# Patient Record
Sex: MALE | Race: BLACK OR AFRICAN AMERICAN | URBAN - NONMETROPOLITAN AREA
[De-identification: names, ages, dates, MRNs, and addresses within clinical notes are randomized per-mention and may not be internally consistent; named-entity substitution may affect disease eponyms.]

---

## 2023-03-17 ENCOUNTER — APPOINTMENT (OUTPATIENT)
Dept: GENERAL RADIOLOGY | Age: 38
End: 2023-03-17

## 2023-03-17 ENCOUNTER — HOSPITAL ENCOUNTER (EMERGENCY)
Age: 38
Discharge: HOME | End: 2023-03-18
Attending: EMERGENCY MEDICINE

## 2023-03-17 VITALS
RESPIRATION RATE: 20 BRPM | OXYGEN SATURATION: 98 % | BODY MASS INDEX: 23.86 KG/M2 | WEIGHT: 180 LBS | SYSTOLIC BLOOD PRESSURE: 123 MMHG | DIASTOLIC BLOOD PRESSURE: 74 MMHG | HEIGHT: 73 IN | HEART RATE: 51 BPM | TEMPERATURE: 98 F

## 2023-03-17 DIAGNOSIS — R07.9 CHEST PAIN, UNSPECIFIED TYPE: Primary | ICD-10-CM

## 2023-03-17 LAB
ALBUMIN SERPL BCG-MCNC: 4.2 G/DL (ref 3.5–5.1)
ALP SERPL-CCNC: 69 U/L (ref 38–126)
ALT SERPL W/O P-5'-P-CCNC: 30 U/L (ref 11–66)
ANION GAP SERPL CALC-SCNC: 8 MEQ/L (ref 8–16)
AST SERPL-CCNC: 22 U/L (ref 5–40)
BASOPHILS ABSOLUTE: 0 THOU/MM3 (ref 0–0.1)
BASOPHILS NFR BLD AUTO: 0.2 %
BILIRUB CONJ SERPL-MCNC: < 0.2 MG/DL (ref 0–0.3)
BILIRUB SERPL-MCNC: 0.7 MG/DL (ref 0.3–1.2)
BUN SERPL-MCNC: 9 MG/DL (ref 7–22)
CALCIUM SERPL-MCNC: 9.5 MG/DL (ref 8.5–10.5)
CHLORIDE SERPL-SCNC: 103 MEQ/L (ref 98–111)
CO2 SERPL-SCNC: 28 MEQ/L (ref 23–33)
CREAT SERPL-MCNC: 1.1 MG/DL (ref 0.4–1.2)
D DIMER PPP IA.FEU-MCNC: 368 NG/ML FEU (ref 0–500)
DEPRECATED RDW RBC AUTO: 47.1 FL (ref 35–45)
EOSINOPHIL NFR BLD AUTO: 1.3 %
EOSINOPHILS ABSOLUTE: 0.1 THOU/MM3 (ref 0–0.4)
ERYTHROCYTE [DISTWIDTH] IN BLOOD BY AUTOMATED COUNT: 15 % (ref 11.5–14.5)
GFR SERPL CREATININE-BSD FRML MDRD: > 60 ML/MIN/1.73M2
GLUCOSE SERPL-MCNC: 95 MG/DL (ref 70–108)
HCT VFR BLD AUTO: 45.2 % (ref 42–52)
HGB BLD-MCNC: 15.3 GM/DL (ref 14–18)
IMM GRANULOCYTES # BLD AUTO: 0.01 THOU/MM3 (ref 0–0.07)
IMM GRANULOCYTES NFR BLD AUTO: 0.2 %
LYMPHOCYTES ABSOLUTE: 3.5 THOU/MM3 (ref 1–4.8)
LYMPHOCYTES NFR BLD AUTO: 57.2 %
MAGNESIUM SERPL-MCNC: 2 MG/DL (ref 1.6–2.4)
MCH RBC QN AUTO: 29.1 PG (ref 26–33)
MCHC RBC AUTO-ENTMCNC: 33.8 GM/DL (ref 32.2–35.5)
MCV RBC AUTO: 85.9 FL (ref 80–94)
MONOCYTES ABSOLUTE: 0.4 THOU/MM3 (ref 0.4–1.3)
MONOCYTES NFR BLD AUTO: 7.2 %
NEUTROPHILS NFR BLD AUTO: 33.9 %
NRBC BLD AUTO-RTO: 0 /100 WBC
OSMOLALITY SERPL CALC.SUM OF ELEC: 276 MOSMOL/KG (ref 275–300)
PLATELET # BLD AUTO: 173 THOU/MM3 (ref 130–400)
PMV BLD AUTO: 11.2 FL (ref 9.4–12.4)
POTASSIUM SERPL-SCNC: 4 MEQ/L (ref 3.5–5.2)
PROT SERPL-MCNC: 7.3 G/DL (ref 6.1–8)
RBC # BLD AUTO: 5.26 MILL/MM3 (ref 4.7–6.1)
SEGMENTED NEUTROPHILS ABSOLUTE COUNT: 2.1 THOU/MM3 (ref 1.8–7.7)
SODIUM SERPL-SCNC: 139 MEQ/L (ref 135–145)
TROPONIN T: < 0.01 NG/ML
WBC # BLD AUTO: 6.1 THOU/MM3 (ref 4.8–10.8)

## 2023-03-17 PROCEDURE — 85025 COMPLETE CBC W/AUTO DIFF WBC: CPT

## 2023-03-17 PROCEDURE — 80053 COMPREHEN METABOLIC PANEL: CPT

## 2023-03-17 PROCEDURE — 83735 ASSAY OF MAGNESIUM: CPT

## 2023-03-17 PROCEDURE — 82248 BILIRUBIN DIRECT: CPT

## 2023-03-17 PROCEDURE — 84484 ASSAY OF TROPONIN QUANT: CPT

## 2023-03-17 PROCEDURE — 99285 EMERGENCY DEPT VISIT HI MDM: CPT

## 2023-03-17 PROCEDURE — 85379 FIBRIN DEGRADATION QUANT: CPT

## 2023-03-17 PROCEDURE — 36415 COLL VENOUS BLD VENIPUNCTURE: CPT

## 2023-03-17 PROCEDURE — 93005 ELECTROCARDIOGRAM TRACING: CPT | Performed by: EMERGENCY MEDICINE

## 2023-03-17 PROCEDURE — 71046 X-RAY EXAM CHEST 2 VIEWS: CPT

## 2023-03-17 ASSESSMENT — PAIN - FUNCTIONAL ASSESSMENT: PAIN_FUNCTIONAL_ASSESSMENT: 0-10

## 2023-03-17 ASSESSMENT — PAIN DESCRIPTION - LOCATION: LOCATION: CHEST

## 2023-03-18 LAB
EKG ATRIAL RATE: 61 BPM
EKG P AXIS: 53 DEGREES
EKG P-R INTERVAL: 166 MS
EKG Q-T INTERVAL: 406 MS
EKG QRS DURATION: 94 MS
EKG QTC CALCULATION (BAZETT): 408 MS
EKG R AXIS: 47 DEGREES
EKG T AXIS: -2 DEGREES
EKG VENTRICULAR RATE: 61 BPM
TROPONIN T: < 0.01 NG/ML

## 2023-03-18 PROCEDURE — 93010 ELECTROCARDIOGRAM REPORT: CPT | Performed by: INTERNAL MEDICINE

## 2023-03-18 ASSESSMENT — HEART SCORE: ECG: 0

## 2023-03-18 NOTE — ED TRIAGE NOTES
Patient presents to the ed with c/o chest pain that has been on going on for about a year. Pt rating 6/10 at this time time. Denies taking any medication. Ekg complete. Tele place.

## 2023-03-18 NOTE — ED PROVIDER NOTES
[address] [address]          Pt Name: Francie Tejeda  MRN: [id number]  [place] 1985  Date of evaluation: 3/17/2023  ED Resident: Beth Mason MD  ED Attending: Dr. Sandor Reyes       Chief Complaint   Patient presents with    Chest Pain     History obtained from the patient. HISTORY OF PRESENT ILLNESS    HPI  Francie Tejeda is a 40 y.o. male who presents to the emergency department for evaluation of chest pain and shortness of breath. Patient states that he has been having chest pain and shortness of breath intermittently for approximately 1 year. He was diagnosed with pneumonia in his [place] country [place], given antibiotics and his condition improved, however, shortly after this he began to experience chest pain and shortness of breath. His chest pain is worse on exertion, which induces anxiety and shortness of breath. The patient feels like his chest gets tight and he cannot get enough air an. He recently traveled from [place] to the [place], approximately a week ago. Patient does not smoke, consume alcohol, no history of blood clots, no cancer history, no cardiac history in the family, no recent illness or sick contacts, afebrile, denies leg pain or swelling, abdominal pain, nausea, vomiting, diarrhea, dysuria. He does have a history of anxiety, takes a medication from his home country that helps him \"calm down\" when he has these episodes. The patient has no other acute complaints at this time. PAST MEDICAL AND SURGICAL HISTORY   History reviewed. No pertinent past medical history. History reviewed. No pertinent surgical history. MEDICATIONS   No current facility-administered medications for this encounter. No current outpatient medications on file.       SOCIAL HISTORY     Social History     Social History Narrative    Not on file          ALLERGIES   No Known Allergies      FAMILY HISTORY History reviewed. No pertinent family history. PHYSICAL EXAM     ED Triage Vitals [03/17/23 2054]   BP Temp Temp Source Heart Rate Resp SpO2 Height Weight   133/73 98 °F (36.7 °C) Oral 60 22 98 % 6' 1\" (1.854 m) 180 lb (81.6 kg)         Additional Vital Signs:  Vitals:    03/17/23 2340   BP: 123/74   Pulse: 51   Resp: 20   Temp:    SpO2: 98%       Physical Exam      DIFFERENTIALS   Initial Assessment: Given the patient's above chief complaint and findings on history and physical examination, I thought it was appropriate to consider the following emergency medical conditions:    Anxiety, ACS, PE, pneumothorax, myocarditis, pericarditis, pneumonia    Although some of these diagnoses are unlikely they were considered in my medical decision making. Chronic Conditions considered: There is no problem list on file for this patient. ED RESULTS   Laboratory results:  Labs Reviewed   CBC WITH AUTO DIFFERENTIAL - Abnormal; Notable for the following components:       Result Value    RDW-CV 15.0 (*)     RDW-SD 47.1 (*)     All other components within normal limits   TROPONIN   MAGNESIUM   BASIC METABOLIC PANEL   HEPATIC FUNCTION PANEL   D-DIMER, QUANTITATIVE   ANION GAP   OSMOLALITY   GLOMERULAR FILTRATION RATE, ESTIMATED   TROPONIN       Radiologic studies results:  XR CHEST (2 VW)   Final Result   Impression:   1. No acute cardiopulmonary disease seen. This document has been electronically signed by: Cecy Colunga MD on    03/17/2023 09:59 PM          ED Medications administered this visit: Medications - No data to display      [address]      Available laboratory and imaging results were independently reviewed and clinically correlated. Decision Rules/Clinical Scores utilized: Heart score 1    Code Status:  Not addressed during this ED visit    Social determinants of health impacting treatment or disposition: New to the [place], needs to establish primary care here.     Medical Commodities impacting treatment or disposition:   Patient denies significant medical history. Consultants: Not Applicable. Final Assessment and Plan:   51-year-old male presents with episodic exertional chest pain, which is accompanied by anxiety and shortness of breath. Negative personal or family history. Recent travel with chest pain and shortness of breath necessitated D-dimer. D-dimer was within normal limits. Chest x-ray unremarkable. EKG negative for ST or T wave changes, troponin not elevated. Labs unremarkable. Patient was given an appointment at the chest pain clinic for Wednesday. He plans to go and establish care with a cardiologist.  He was also given information on the family medicine resident clinic for primary care. Discharged home. MEDICATION CHANGES     DISCHARGE MEDICATIONS:  There are no discharge medications for this patient. FINAL DISPOSITION     Final diagnoses:   Chest pain, unspecified type     Condition: condition: stable  Dispo: Discharge to home    PATIENT REFERRED TO:  [facility] [address],[address] [address] [facility] [address] [place] [address]    Follow-up from ER visit    [address] [facility]  [address] [place] [address]  [phone]  Call   Follow-up from ED for chest pain. Appointment scheduled for Wednesday, 3/22 at 10 AM. Directions to clinic attached. The results of pertinent diagnostic studies and exam findings were discussed. The patients provisional diagnosis and plan of care were discussed with the patient who expressed understanding. Strict verbal and written return precautions, instructions and appropriate follow-up provided to  the patient    Critical Care Time   CRITICAL CARE:  None    PROCEDURES: (None if blank)  Procedures: This transcription was electronically signed.  Parts of this transcriptions may have been dictated by use of voice recognition software and electronically transcribed, and parts may have been transcribed with the assistance of an ED scribe. The transcription may contain errors not detected in proofreading.     Electronically Signed: Emy Wilks MD, 03/18/23, 2:22 AM        Emy Wilks MD  Resident  03/18/23 7339

## 2023-03-18 NOTE — DISCHARGE INSTRUCTIONS
You were seen at [facility] emergency department for chest pain and shortness of breath. Your EKG did not show any signs of heart attack, and your lab work was normal.  Your chest x-ray was also normal.       Please follow-up with a cardiologist on Wednesday at 10 AM.  Information attached. Do not hesitate to return to the emergency department if your chest pain gets worse, or if you experience worsening shortness of breath. Information on the family medicine resident clinic is also attached your paperwork.

## 2023-03-22 ENCOUNTER — OFFICE VISIT (OUTPATIENT)
Dept: CARDIOLOGY CLINIC | Age: 38
End: 2023-03-22

## 2023-03-22 VITALS
WEIGHT: 216 LBS | HEIGHT: 73 IN | SYSTOLIC BLOOD PRESSURE: 130 MMHG | BODY MASS INDEX: 28.63 KG/M2 | HEART RATE: 90 BPM | DIASTOLIC BLOOD PRESSURE: 69 MMHG

## 2023-03-22 DIAGNOSIS — R06.02 SOB (SHORTNESS OF BREATH): ICD-10-CM

## 2023-03-22 DIAGNOSIS — I10 PRIMARY HYPERTENSION: ICD-10-CM

## 2023-03-22 DIAGNOSIS — R07.2 PRECORDIAL PAIN: Primary | ICD-10-CM

## 2023-03-22 PROCEDURE — 3075F SYST BP GE 130 - 139MM HG: CPT | Performed by: NUCLEAR MEDICINE

## 2023-03-22 PROCEDURE — 3078F DIAST BP <80 MM HG: CPT | Performed by: NUCLEAR MEDICINE

## 2023-03-22 PROCEDURE — 99204 OFFICE O/P NEW MOD 45 MIN: CPT | Performed by: NUCLEAR MEDICINE

## 2023-03-22 RX ORDER — PANTOPRAZOLE SODIUM 40 MG/1
40 TABLET, DELAYED RELEASE ORAL
Qty: 90 TABLET | Refills: 1 | Status: SHIPPED | OUTPATIENT
Start: 2023-03-22

## 2023-03-22 ASSESSMENT — ENCOUNTER SYMPTOMS
VOMITING: 0
BACK PAIN: 0
SHORTNESS OF BREATH: 1
CONSTIPATION: 0
DIARRHEA: 0
ABDOMINAL DISTENTION: 0
NAUSEA: 0
COLOR CHANGE: 0
CHEST TIGHTNESS: 1
BLOOD IN STOOL: 0
PHOTOPHOBIA: 0
RECTAL PAIN: 0
ANAL BLEEDING: 0
ABDOMINAL PAIN: 0

## 2023-03-22 NOTE — PROGRESS NOTES
New patient here today. Was in the Ed for chest pain. Is still having chest pain, sob, palpitations, dizziness (when walking)  and blurred vision.  States that he feels weak like he is going to faint
General: No swelling, tenderness, deformity or signs of injury. Cervical back: Normal range of motion. Right lower leg: No edema. Left lower leg: No edema. Skin:     Coloration: Skin is not jaundiced or pale. Findings: No bruising, erythema, lesion or rash. Neurological:      Mental Status: He is alert and oriented to person, place, and time. Cranial Nerves: No cranial nerve deficit. Sensory: No sensory deficit. Motor: No weakness. Coordination: Coordination normal.      Gait: Gait normal.      Deep Tendon Reflexes: Reflexes normal.   Psychiatric:         Mood and Affect: Mood normal.     /69   Pulse 90   Ht 6' 1\" (1.854 m)   Wt 216 lb (98 kg)   BMI 28.50 kg/m²     Assessment:      Diagnosis Orders   1. Precordial pain        2. Primary hypertension        As above  Symptoms as above  Risk for CAD    Plan:  No follow-ups on file. Discussed  Non invasive cardiac testing will be ordered to further evaluate for any ischemic or structural heart disease as a cause of the patient symptoms. We will proceed with a Stress Cardiolite test and echo soon. Continue risk factor modification and medical management  Thank you for allowing me to participate in the care of your patient. Please don't hesitate to contact me regarding any further issues related to the patient care    Orders Placed:  No orders of the defined types were placed in this encounter. Medications Prescribed:  No orders of the defined types were placed in this encounter. Discussed use, benefit, and side effects of prescribed medications. All patient questions answered. Pt voicedunderstanding. Instructed to continue current medications, diet and exercise. Continue risk factor modification and medical management. Patient agreed with treatment plan. Follow up as directed.     Electronically signedby Luke Winston MD on 3/22/2023 at 10:35 AM

## 2023-04-20 ENCOUNTER — APPOINTMENT (OUTPATIENT)
Dept: GENERAL RADIOLOGY | Age: 38
End: 2023-04-20
Payer: COMMERCIAL

## 2023-04-20 ENCOUNTER — HOSPITAL ENCOUNTER (EMERGENCY)
Age: 38
Discharge: HOME | End: 2023-04-20
Payer: COMMERCIAL

## 2023-04-20 VITALS
TEMPERATURE: 97.7 F | OXYGEN SATURATION: 100 % | WEIGHT: 211 LBS | SYSTOLIC BLOOD PRESSURE: 126 MMHG | RESPIRATION RATE: 18 BRPM | BODY MASS INDEX: 27.84 KG/M2 | HEART RATE: 58 BPM | DIASTOLIC BLOOD PRESSURE: 79 MMHG

## 2023-04-20 DIAGNOSIS — M79.602 LEFT ARM PAIN: Primary | ICD-10-CM

## 2023-04-20 DIAGNOSIS — M25.512 ACUTE PAIN OF LEFT SHOULDER: ICD-10-CM

## 2023-04-20 DIAGNOSIS — S50.12XA CONTUSION OF LEFT FOREARM, INITIAL ENCOUNTER: ICD-10-CM

## 2023-04-20 PROCEDURE — 73090 X-RAY EXAM OF FOREARM: CPT

## 2023-04-20 PROCEDURE — 6370000000 HC RX 637 (ALT 250 FOR IP): Performed by: NURSE PRACTITIONER

## 2023-04-20 PROCEDURE — 72100 X-RAY EXAM L-S SPINE 2/3 VWS: CPT

## 2023-04-20 PROCEDURE — 99283 EMERGENCY DEPT VISIT LOW MDM: CPT

## 2023-04-20 PROCEDURE — 73030 X-RAY EXAM OF SHOULDER: CPT

## 2023-04-20 PROCEDURE — 73060 X-RAY EXAM OF HUMERUS: CPT

## 2023-04-20 PROCEDURE — 71046 X-RAY EXAM CHEST 2 VIEWS: CPT

## 2023-04-20 PROCEDURE — 72072 X-RAY EXAM THORAC SPINE 3VWS: CPT

## 2023-04-20 RX ORDER — LIDOCAINE 4 G/G
1 PATCH TOPICAL DAILY
Status: DISCONTINUED | OUTPATIENT
Start: 2023-04-20 | End: 2023-04-20 | Stop reason: HOSPADM

## 2023-04-20 RX ORDER — HYDROCODONE BITARTRATE AND ACETAMINOPHEN 5; 325 MG/1; MG/1
1 TABLET ORAL EVERY 6 HOURS PRN
Qty: 10 TABLET | Refills: 0 | Status: SHIPPED | OUTPATIENT
Start: 2023-04-20 | End: 2023-04-23

## 2023-04-20 RX ORDER — OXYCODONE HYDROCHLORIDE AND ACETAMINOPHEN 5; 325 MG/1; MG/1
1 TABLET ORAL ONCE
Status: COMPLETED | OUTPATIENT
Start: 2023-04-20 | End: 2023-04-20

## 2023-04-20 RX ORDER — CYCLOBENZAPRINE HCL 10 MG
10 TABLET ORAL 3 TIMES DAILY PRN
Qty: 30 TABLET | Refills: 0 | Status: SHIPPED | OUTPATIENT
Start: 2023-04-20 | End: 2023-04-30

## 2023-04-20 RX ORDER — LIDOCAINE 4 G/G
1 PATCH TOPICAL DAILY
Qty: 30 PATCH | Refills: 0 | Status: SHIPPED | OUTPATIENT
Start: 2023-04-20 | End: 2023-05-20

## 2023-04-20 RX ORDER — CYCLOBENZAPRINE HCL 10 MG
10 TABLET ORAL ONCE
Status: COMPLETED | OUTPATIENT
Start: 2023-04-20 | End: 2023-04-20

## 2023-04-20 RX ADMIN — CYCLOBENZAPRINE 10 MG: 10 TABLET, FILM COATED ORAL at 09:26

## 2023-04-20 RX ADMIN — OXYCODONE AND ACETAMINOPHEN 1 TABLET: 5; 325 TABLET ORAL at 09:26

## 2023-04-20 ASSESSMENT — PAIN DESCRIPTION - PAIN TYPE: TYPE: ACUTE PAIN

## 2023-04-20 ASSESSMENT — PAIN DESCRIPTION - LOCATION: LOCATION: SHOULDER;ARM

## 2023-04-20 ASSESSMENT — PAIN - FUNCTIONAL ASSESSMENT: PAIN_FUNCTIONAL_ASSESSMENT: 0-10

## 2023-04-20 ASSESSMENT — PAIN DESCRIPTION - ORIENTATION: ORIENTATION: LEFT

## 2023-04-20 ASSESSMENT — PAIN SCALES - GENERAL: PAINLEVEL_OUTOF10: 7

## 2023-04-20 NOTE — DISCHARGE INSTRUCTIONS
Rest, stay well-hydrated. Over-the-counter Motrin as needed for mild pain. Norco as needed for moderate to severe pain. Flexeril 3 times daily as needed for muscle spasms, observe for sedative precautions, no drinking, driving or operating heavy machinery. Lidocaine patches as prescribed. Follow-up with [facility] walk-in clinic from 7:30 AM to 4 PM tomorrow without fail. If any worsening or concerns return to the ER immediately. You have been referred to [facility] occupational health please call and schedule a follow-up appointment.

## 2023-04-20 NOTE — ED NOTES
Pt was working this morning when a piece of metal (approx. 150 lbs) fell down onto him hitting his left shoulder, sliding down his arm. Pt rates pain in his left shoulder, rib area near axilla, and upper arm a 7/10. Abrasion noted to pt left forearm. Pt denies falling, or metal hitting his head.       Sangeeta Wilcox, ION  04/20/23 4114

## 2023-04-20 NOTE — ED PROVIDER NOTES
or subluxation. She will be discharged home to care of family. Plan of care discussed with patient and family who are in agreement. Prescription sent to preferred pharmacy. Patient to follow-up with [facility] tomorrow for walk-in clinic. Patient to follow-up with [facility] occupational health. Strict return instructions provided. Amount and/or Complexity of Data Reviewed  Tests in the radiology section of CPT®: ordered and reviewed  Decide to obtain previous medical records or to obtain history from someone other than the patient: no  Obtain history from someone other than the patient: no  Review and summarize past medical records: yes  Discuss the patient with other providers: no  Independent visualization of images, tracings, or specimens: yes    Risk of Complications, Morbidity, and/or Mortality  Presenting problems: moderate  Diagnostic procedures: moderate  Management options: moderate    /  ED Course as of 04/20/23 1758   Thu Apr 20, 2023   0854 XR CHEST (2 VW)  No acute intrathoracic process. [SC]   0854 XR SHOULDER LEFT (MIN 2 VIEWS)  No fracture or acute bony abnormality visualized. [SC]   0854 XR HUMERUS LEFT (MIN 2 VIEWS)  No fracture or acute bony abnormality visualized. [SC]   0854 XR RADIUS ULNA LEFT (2 VIEWS)  No fracture or acute bony abnormality visualized. [SC]   1046 XR THORACIC SPINE (3 VIEWS)  No fracture or acute bony abnormality visualized. Mild degenerative changes are noted. [SC]   1046 XR LUMBAR SPINE (2-3 VIEWS)  No acute fracture or subluxation. [SC]      ED Course User Index  [SC] Nanette Bowling, APRN - CNP     Vitals Reviewed:    Vitals:    04/20/23 0747 04/20/23 0944   BP: 122/68 126/79   Pulse: 64 58   Resp: 17 18   Temp: 97.7 °F (36.5 °C)    TempSrc: Oral    SpO2: 97% 100%   Weight: 211 lb (95.7 kg)        The patient was seen and examined. Appropriate diagnostic testing was performed and results reviewed with the patient.       The results of pertinent diagnostic studies

## 2023-04-20 NOTE — ED NOTES
Pt now complaining of thoracic and lumbar pain near the spine. Notified Archana CNP.      Forest Sanchez RN  04/20/23 0001

## 2023-05-18 ENCOUNTER — TELEPHONE (OUTPATIENT)
Dept: CARDIOLOGY CLINIC | Age: 38
End: 2023-05-18

## 2023-05-18 NOTE — TELEPHONE ENCOUNTER
Wife left msg to reschedule missed stress test and echo      Left msg for pt to call office to schedule tests

## 2023-06-01 ENCOUNTER — OFFICE VISIT (OUTPATIENT)
Dept: FAMILY MEDICINE CLINIC | Age: 38
End: 2023-06-01
Payer: MEDICAID

## 2023-06-01 VITALS
BODY MASS INDEX: 28.92 KG/M2 | RESPIRATION RATE: 20 BRPM | TEMPERATURE: 97.7 F | HEIGHT: 73 IN | WEIGHT: 218.2 LBS | SYSTOLIC BLOOD PRESSURE: 96 MMHG | DIASTOLIC BLOOD PRESSURE: 74 MMHG | OXYGEN SATURATION: 99 % | HEART RATE: 78 BPM

## 2023-06-01 DIAGNOSIS — R42 DIZZINESS: Primary | ICD-10-CM

## 2023-06-01 PROCEDURE — 99204 OFFICE O/P NEW MOD 45 MIN: CPT | Performed by: STUDENT IN AN ORGANIZED HEALTH CARE EDUCATION/TRAINING PROGRAM

## 2023-06-01 PROCEDURE — 1036F TOBACCO NON-USER: CPT | Performed by: STUDENT IN AN ORGANIZED HEALTH CARE EDUCATION/TRAINING PROGRAM

## 2023-06-01 PROCEDURE — G8419 CALC BMI OUT NRM PARAM NOF/U: HCPCS | Performed by: STUDENT IN AN ORGANIZED HEALTH CARE EDUCATION/TRAINING PROGRAM

## 2023-06-01 PROCEDURE — G8427 DOCREV CUR MEDS BY ELIG CLIN: HCPCS | Performed by: STUDENT IN AN ORGANIZED HEALTH CARE EDUCATION/TRAINING PROGRAM

## 2023-06-01 SDOH — ECONOMIC STABILITY: FOOD INSECURITY: WITHIN THE PAST 12 MONTHS, THE FOOD YOU BOUGHT JUST DIDN'T LAST AND YOU DIDN'T HAVE MONEY TO GET MORE.: NEVER TRUE

## 2023-06-01 SDOH — ECONOMIC STABILITY: INCOME INSECURITY: HOW HARD IS IT FOR YOU TO PAY FOR THE VERY BASICS LIKE FOOD, HOUSING, MEDICAL CARE, AND HEATING?: NOT HARD AT ALL

## 2023-06-01 SDOH — ECONOMIC STABILITY: FOOD INSECURITY: WITHIN THE PAST 12 MONTHS, YOU WORRIED THAT YOUR FOOD WOULD RUN OUT BEFORE YOU GOT MONEY TO BUY MORE.: NEVER TRUE

## 2023-06-01 SDOH — ECONOMIC STABILITY: HOUSING INSECURITY
IN THE LAST 12 MONTHS, WAS THERE A TIME WHEN YOU DID NOT HAVE A STEADY PLACE TO SLEEP OR SLEPT IN A SHELTER (INCLUDING NOW)?: NO

## 2023-06-01 ASSESSMENT — PATIENT HEALTH QUESTIONNAIRE - PHQ9
SUM OF ALL RESPONSES TO PHQ QUESTIONS 1-9: 1
SUM OF ALL RESPONSES TO PHQ9 QUESTIONS 1 & 2: 1
2. FEELING DOWN, DEPRESSED OR HOPELESS: 0
1. LITTLE INTEREST OR PLEASURE IN DOING THINGS: 1

## 2023-06-01 ASSESSMENT — ENCOUNTER SYMPTOMS
CONSTIPATION: 0
EYE PAIN: 0
DIARRHEA: 0
ABDOMINAL PAIN: 0
SHORTNESS OF BREATH: 0
COUGH: 0

## 2023-06-01 NOTE — PROGRESS NOTES
S: 45 y.o. male with   Chief Complaint   Patient presents with    Back Pain     X4 months eyes twitch, lightheaded ,heart beats fast occ, tightness in chest occ,thighs and bottom of feet cramps up -making the toes spread out, lower back pains       Chief complaint, [place], and all pertinent details of the case reviewed with the resident. Please see resident's note for specific details discussed at today's visit. BP Readings from Last 3 Encounters:   06/01/23 96/74   04/20/23 126/79   03/22/23 130/69     Wt Readings from Last 3 Encounters:   06/01/23 218 lb 3.2 oz (99 kg)   04/20/23 211 lb (95.7 kg)   03/22/23 216 lb (98 kg)       O: VS:  height is 6' 1\" (1.854 m) and weight is 218 lb 3.2 oz (99 kg). His skin temperature is 97.7 °F (36.5 °C). His blood pressure is 96/74 and his pulse is 78. His respiration is 20 and oxygen saturation is 99%. AAO/NAD, appropriate affect for mood  CBC- 45% hct, bmp, LFT's -wnl in march  A:orthostatic hypotension causing dizziness with standing   Diagnosis Orders   1. Dizziness  Holter Monitor 48 Hour    Tilt Table Test          Plan:Increase fluids to 10-12 bottle equivalents/day  Salt tablets tid  Keep cardiology test appt for echo and stress, get holter, tilt table  F/u in 1 month      Health Maintenance Due   Topic Date Due    COVID-19 Vaccine (1) Never done    Varicella vaccine (1 of 2 - 2-dose childhood series) Never done    Depression Screen  Never done    HIV screen  Never done    Hepatitis C screen  Never done    DTaP/Tdap/Td vaccine (1 - Tdap) Never done       Attending Physician Statement  I have discussed the case, including pertinent history and exam findings with the resident. I also have seen the patient and performed key portions of the examination. I agree with the documented assessment and plan as documented by the resident.         Shahzad Sommer MD 6/1/2023 9:43 AM

## 2023-06-01 NOTE — PROGRESS NOTES
Grace Jack (:  1985) is a 45 y.o. male,Established patient, here for evaluation of the following chief complaint(s):  Back Pain (X4 months eyes twitch, lightheaded ,heart beats fast occ, tightness in chest occ,thighs and bottom of feet cramps up -making the toes spread out, lower back pains)         ASSESSMENT/PLAN:  1. Dizziness  -     Holter Monitor 48 Hour; Future  -     Tilt Table Test    Obtain workup as above, continue conservative measures    Return in about 4 weeks (around 2023). Subjective   SUBJECTIVE/OBJECTIVE:  HPI    Dizziness  Symptoms have been present for months  Symptoms are intermittent, usually last for 5-10 minutes. Worsened with with standing, not with coughing  Attests to tinnitus during this same time. Hearing is normal  Denied similar incidents and no recent viral illness   Same medications and denied drug use    Described as lightheadedness with standing  Denied room spinning sensation  Denied fainting  Denied difficulty walking/disequilibrium normally but during episodes can feel unsteady  Denied anxiety normally but can have panic and fear of these episodes during, says even action movies can worsen. Can also have some eyelid twitching during this. Says when he is asleep he can have spasm of legs. Feels like he can feel aorta beating. Says symptoms happen daily.       Drinking 5-10 bottles of water a day, which he says helps    (Says before the episodes can have some paresthesias in hands, shortness of air, palpitations,  diaphoresis, nausea, tinnitus and visual blurring    Denied vomiting, headache, double vision, visual loss, slurred speech, numbness of the face or body, weakness, clumsiness, or incoordination    Denied no heart history in himself or in family)    Vitals normal   Orthostatics have been completed and were abnormal, concern for orthostatic hypotension  EKG has been completed and was normal  Has a stress test scheduled   Labs have been

## 2023-06-01 NOTE — PATIENT INSTRUCTIONS
Take three salt tablets a day    Oral fluid intake should be encouraged to a target of 3 L daily and a daily salt intake of 8 to 12 g of sodium chloride (3.2 to 4.8 g of sodium)    Will order heart monitor    Will order tilt table test to confirm the diagnosis     Have a consistent amount of water each day    Continue with cardiology

## 2023-06-19 ENCOUNTER — HOSPITAL ENCOUNTER (OUTPATIENT)
Dept: NON INVASIVE DIAGNOSTICS | Age: 38
Discharge: HOME | End: 2023-06-19
Payer: COMMERCIAL

## 2023-06-19 ENCOUNTER — TELEPHONE (OUTPATIENT)
Dept: CARDIOLOGY CLINIC | Age: 38
End: 2023-06-19

## 2023-06-19 DIAGNOSIS — R07.2 PRECORDIAL PAIN: ICD-10-CM

## 2023-06-19 DIAGNOSIS — R06.02 SOB (SHORTNESS OF BREATH): ICD-10-CM

## 2023-06-19 DIAGNOSIS — I10 PRIMARY HYPERTENSION: ICD-10-CM

## 2023-06-19 LAB
LV EF: 47 %
LV EF: 53 %
LVEF MODALITY: NORMAL
LVEF MODALITY: NORMAL

## 2023-06-19 PROCEDURE — 78452 HT MUSCLE IMAGE SPECT MULT: CPT | Performed by: NUCLEAR MEDICINE

## 2023-06-19 PROCEDURE — 93306 TTE W/DOPPLER COMPLETE: CPT

## 2023-06-19 PROCEDURE — 3430000000 HC RX DIAGNOSTIC RADIOPHARMACEUTICAL: Performed by: NUCLEAR MEDICINE

## 2023-06-19 PROCEDURE — 93017 CV STRESS TEST TRACING ONLY: CPT | Performed by: NUCLEAR MEDICINE

## 2023-06-19 PROCEDURE — A9500 TC99M SESTAMIBI: HCPCS | Performed by: NUCLEAR MEDICINE

## 2023-06-19 RX ORDER — TETRAKIS(2-METHOXYISOBUTYLISOCYANIDE)COPPER(I) TETRAFLUOROBORATE 1 MG/ML
33.6 INJECTION, POWDER, LYOPHILIZED, FOR SOLUTION INTRAVENOUS
Status: COMPLETED | OUTPATIENT
Start: 2023-06-19 | End: 2023-06-19

## 2023-06-19 RX ORDER — TETRAKIS(2-METHOXYISOBUTYLISOCYANIDE)COPPER(I) TETRAFLUOROBORATE 1 MG/ML
9.5 INJECTION, POWDER, LYOPHILIZED, FOR SOLUTION INTRAVENOUS
Status: COMPLETED | OUTPATIENT
Start: 2023-06-19 | End: 2023-06-19

## 2023-06-19 RX ADMIN — Medication 9.5 MILLICURIE: at 07:50

## 2023-06-19 RX ADMIN — Medication 33.6 MILLICURIE: at 09:05

## 2023-06-19 NOTE — TELEPHONE ENCOUNTER
Okay. Will see him for follow up as available   Start him on diovan 40 daily and toprol 25 daily until follow up

## 2023-06-21 NOTE — TELEPHONE ENCOUNTER
Pt notified. He asked why is feeling the way he is then? Informed him cardiac testing does not point to a cause. He says he gets \"uneasiness in the chest. Comes and goes\" and that eating makes it better. Asked pt if he has seen a GI doctor? Pt states he has not. Informed him his symptoms may be GI related. Pt states he will try to see a GI doctor.

## 2023-08-21 ENCOUNTER — OFFICE VISIT (OUTPATIENT)
Dept: FAMILY MEDICINE CLINIC | Age: 38
End: 2023-08-21

## 2023-08-21 VITALS
HEART RATE: 65 BPM | TEMPERATURE: 98 F | HEIGHT: 73 IN | BODY MASS INDEX: 29.74 KG/M2 | SYSTOLIC BLOOD PRESSURE: 124 MMHG | RESPIRATION RATE: 16 BRPM | OXYGEN SATURATION: 96 % | DIASTOLIC BLOOD PRESSURE: 74 MMHG | WEIGHT: 224.4 LBS

## 2023-08-21 DIAGNOSIS — K21.9 GASTROESOPHAGEAL REFLUX DISEASE, UNSPECIFIED WHETHER ESOPHAGITIS PRESENT: ICD-10-CM

## 2023-08-21 DIAGNOSIS — F41.9 ANXIETY: Primary | ICD-10-CM

## 2023-08-21 RX ORDER — SERTRALINE HYDROCHLORIDE 25 MG/1
25 TABLET, FILM COATED ORAL DAILY
Qty: 30 TABLET | Refills: 3 | Status: SHIPPED | OUTPATIENT
Start: 2023-08-21

## 2023-08-21 RX ORDER — PANTOPRAZOLE SODIUM 40 MG/1
40 TABLET, DELAYED RELEASE ORAL
Qty: 30 TABLET | Refills: 0 | Status: SHIPPED | OUTPATIENT
Start: 2023-08-21

## 2023-08-21 ASSESSMENT — ENCOUNTER SYMPTOMS
CHEST TIGHTNESS: 0
CONSTIPATION: 0
SHORTNESS OF BREATH: 0
DIARRHEA: 0
NAUSEA: 0
WHEEZING: 0
ABDOMINAL PAIN: 1
COUGH: 0
VOMITING: 0

## 2023-08-21 NOTE — PROGRESS NOTES
Levi Moya (:  1985) is a 45 y.o. male,Established patient, here for evaluation of the following chief complaint(s):  Chest Pain (Mid sternal radiating to left side)         ASSESSMENT/PLAN:  1. Anxiety  -     sertraline (ZOLOFT) 25 MG tablet; Take 1 tablet by mouth daily, Disp-30 tablet, R-3Normal  We will start patient on Zoloft 25 mg daily. We will follow-up in 2 weeks to consider titrating up to 50 mg daily. Explained to the patient that this medication takes 4 to 6 weeks to have full effect and he verbalized understanding. Follow-up in 2 weeks. 2. Gastroesophageal reflux disease, unspecified whether esophagitis present  -     pantoprazole (PROTONIX) 40 MG tablet; Take 1 tablet by mouth every morning (before breakfast), Disp-30 tablet, R-0Normal  We will start on Protonix 40 mg daily. Return in about 2 weeks (around 2023). Subjective   SUBJECTIVE/OBJECTIVE:  Patient is a 51-year-old male who presents for same-day appointment for epigastric pain that started the past couple months. Patient states that he started having epigastric pain a few months ago which radiates to his left side under his ribs. Patient has had full cardiac work-up which was negative for ischemia including echo and stress test.  Patient is very anxious on exam and notes that he is also having increased anxiety throughout the day for the last couple months. He is wondering if the acid reflux is related to his anxiety. He notes that he worries frequently throughout the day. Review of Systems   Constitutional:  Negative for activity change and appetite change. Respiratory:  Negative for cough, chest tightness, shortness of breath and wheezing. Cardiovascular:  Negative for chest pain. Gastrointestinal:  Positive for abdominal pain (epigastric pain). Negative for constipation, diarrhea, nausea and vomiting. Neurological:  Negative for dizziness and weakness.         Objective   Vitals:    23 1434
resident.   Corey Salas., DO 8/22/2023 8:08 AM

## 2023-10-13 ENCOUNTER — HOSPITAL ENCOUNTER (OUTPATIENT)
Dept: INTERVENTIONAL RADIOLOGY/VASCULAR | Age: 38
Discharge: HOME | End: 2023-10-13

## 2023-10-13 DIAGNOSIS — Z13.6 ENCOUNTER FOR SCREENING FOR VASCULAR DISEASE: ICD-10-CM

## 2023-10-13 PROCEDURE — 9900000021 US VASCULAR SCREENING

## 2024-01-05 ENCOUNTER — CLINICAL DOCUMENTATION (OUTPATIENT)
Dept: SPIRITUAL SERVICES | Facility: CLINIC | Age: 39
End: 2024-01-05

## 2024-01-05 NOTE — FLOWSHEET NOTE
[facility]   PROGRESS NOTE      Patient: Vikram Rodriguez          YOB: 1985  Age: 38 y.o.  Gender: male            Assessment:  Vikram is a 38-year-old male who contacted  for spiritual / grief support due to what is currently being experienced in his life. Per request: Recent death of his mother.    Interventions:   received a telephone call requesting spiritual / grief support due to the recent death of his mother.    Outcomes:   had spoken prior with patient wife due to experiencing loss in her life.  Patient is requesting assistance / grief support.    Plan:  An appointment is scheduled for spiritual / grief support on 1/8 at [facility].    Electronically signed by Chaplain Juan, on 1/5/2024 at 1:04 PM.  Spiritual Care Department  [facility]  [phone]

## 2024-01-08 ENCOUNTER — CLINICAL DOCUMENTATION (OUTPATIENT)
Dept: SPIRITUAL SERVICES | Facility: CLINIC | Age: 39
End: 2024-01-08

## 2024-01-08 NOTE — PROGRESS NOTES
[facility]   PROGRESS NOTE      Patient: Vikram Rodriguez        YOB: 1985  Age: 38 y.o.  Gender: male            Assessment:  Vikram is a 38-year-old male who contacted  for spiritual / grief support due to what is currently being experienced in his life.   Per request: Recent death of his mother.     Outcomes:    No Show for today's scheduled appointment       Electronically signed by Chaplain Juan, on 1/8/2024 at 8:58 AM.  Spiritual Care Department  [facility]  [phone]

## 2024-01-15 ENCOUNTER — OFFICE VISIT (OUTPATIENT)
Dept: FAMILY MEDICINE CLINIC | Age: 39
End: 2024-01-15
Payer: COMMERCIAL

## 2024-01-15 ENCOUNTER — TELEPHONE (OUTPATIENT)
Dept: FAMILY MEDICINE CLINIC | Age: 39
End: 2024-01-15

## 2024-01-15 VITALS
HEIGHT: 73 IN | SYSTOLIC BLOOD PRESSURE: 118 MMHG | OXYGEN SATURATION: 97 % | TEMPERATURE: 98.5 F | HEART RATE: 67 BPM | RESPIRATION RATE: 18 BRPM | WEIGHT: 231.8 LBS | BODY MASS INDEX: 30.72 KG/M2 | DIASTOLIC BLOOD PRESSURE: 78 MMHG

## 2024-01-15 DIAGNOSIS — I51.7 LVH (LEFT VENTRICULAR HYPERTROPHY): ICD-10-CM

## 2024-01-15 DIAGNOSIS — G51.4 FACIAL TWITCHING: Primary | ICD-10-CM

## 2024-01-15 DIAGNOSIS — F41.9 ANXIETY: ICD-10-CM

## 2024-01-15 DIAGNOSIS — R00.2 PALPITATIONS: ICD-10-CM

## 2024-01-15 DIAGNOSIS — E04.9 ENLARGED THYROID: ICD-10-CM

## 2024-01-15 DIAGNOSIS — K21.9 GASTROESOPHAGEAL REFLUX DISEASE, UNSPECIFIED WHETHER ESOPHAGITIS PRESENT: ICD-10-CM

## 2024-01-15 PROCEDURE — 93000 ELECTROCARDIOGRAM COMPLETE: CPT

## 2024-01-15 PROCEDURE — 99214 OFFICE O/P EST MOD 30 MIN: CPT

## 2024-01-15 PROCEDURE — 1036F TOBACCO NON-USER: CPT

## 2024-01-15 PROCEDURE — G8427 DOCREV CUR MEDS BY ELIG CLIN: HCPCS

## 2024-01-15 PROCEDURE — G8417 CALC BMI ABV UP PARAM F/U: HCPCS

## 2024-01-15 PROCEDURE — G8484 FLU IMMUNIZE NO ADMIN: HCPCS

## 2024-01-15 RX ORDER — TIZANIDINE 2 MG/1
2 TABLET ORAL NIGHTLY PRN
Qty: 30 TABLET | Refills: 0 | Status: SHIPPED | OUTPATIENT
Start: 2024-01-15

## 2024-01-15 RX ORDER — FLUTICASONE PROPIONATE 50 MCG
2 SPRAY, SUSPENSION (ML) NASAL DAILY
Qty: 16 G | Refills: 0 | Status: SHIPPED | OUTPATIENT
Start: 2024-01-15

## 2024-01-15 RX ORDER — CIPROFLOXACIN 500 MG/1
500 TABLET, FILM COATED ORAL 2 TIMES DAILY
Qty: 14 TABLET | Refills: 0 | Status: SHIPPED | OUTPATIENT
Start: 2024-01-15 | End: 2024-01-22

## 2024-01-15 NOTE — PROGRESS NOTES
Vikram Rodriguez is a 38 y.o. male who presents today for:  Chief Complaint   Patient presents with    Dizziness     Patient states that he will be walking and all of a sudden his eyes will twitch, will have to force them close and will press on them to make them \"relax\" States this happens daily and that his head will \"shake\" as well. Denies fainting, feels like he might but never happens.          HPI:   Vikram Rodriguez is 38 y.o. who presents today for walk-in    Patient presents today for feeling dizzy, facial twitching, chest pain and palpitations.  Patient states the symptoms been going on for some time now.  He is concerned because he is never felt like this before.  He states that this happened almost on a daily basis.  States that he thinks it might be his anxiety, however he is concerned that there is more to it.    Patient noted to have previous echocardiogram that shows left ventricular dysfunction and low EF.  Patient states he continues to have chest pain and palpitations.      Objective:     Vitals:    01/15/24 1315   BP: 118/78   Site: Left Upper Arm   Position: Sitting   Cuff Size: Large Adult   Pulse: 67   Resp: 18   Temp: 98.5 °F (36.9 °C)   TempSrc: Oral   SpO2: 97%   Weight: 105.1 kg (231 lb 12.8 oz)   Height: 1.854 m (6' 1\")       Wt Readings from Last 3 Encounters:   01/15/24 105.1 kg (231 lb 12.8 oz)   08/21/23 101.8 kg (224 lb 6.4 oz)   06/12/23 98.9 kg (218 lb)       BP Readings from Last 3 Encounters:   01/15/24 118/78   08/21/23 124/74   06/12/23 122/74       Lab Results   Component Value Date    WBC 9.4 06/12/2023    HGB 14.7 06/12/2023    HCT 44.8 06/12/2023    MCV 88.0 06/12/2023     06/12/2023     Lab Results   Component Value Date     06/12/2023    K 4.2 06/12/2023     06/12/2023    CO2 24 06/12/2023    BUN 9 06/12/2023    CREATININE 0.9 06/12/2023    GLUCOSE 94 06/12/2023    CALCIUM 9.9 06/12/2023    PROT 7.3 03/17/2023    LABALBU 4.2 03/17/2023    BILITOT 0.7 03/17/2023    
encounter      Katelyn Ann Leopold, MD 1/15/2024 2:11 PM

## 2024-01-15 NOTE — TELEPHONE ENCOUNTER
Patient should avoid taking medication together. He has been taking Cipro \"as needed\" but has not needed it for some time. The Zanaflex is for muscle spasms for which I told him to avoid taking both at the same time

## 2024-01-15 NOTE — TELEPHONE ENCOUNTER
Sapphire, from [facility] pharmacy called and states that sometimes patients can experiencing low blood pressure and dizziness and fainting when taking Cipro and Zanaflex together. Wanted to double check to make sure you wanted patient to do both of these.

## 2024-01-16 NOTE — TELEPHONE ENCOUNTER
Lianet, pharmacist at [facility], states that if he is on the cipro for 7 days he can not take any Tizanidine. Patient picked up the Tizanidine last night 1/15/2024 but did not  the Cipro. They will instruct patient when he picks up the Cipro if you are ok with that, pharmacy would like to you to dispense Cipro. Please advise.

## 2024-01-18 NOTE — TELEPHONE ENCOUNTER
I would recommend patient not take Cipro and not dispense it until he needs it.  As long as he is not taking as he is not taking the tizanidine at the same time

## 2024-01-19 NOTE — TELEPHONE ENCOUNTER
Spoke to Lianet, she voiced understanding.  Will put the cipro back until the patient calls for it, and then will remind them not to take the cipro and Tizantine at the same time.

## 2024-01-24 ENCOUNTER — CARE COORDINATION (OUTPATIENT)
Dept: CARE COORDINATION | Age: 39
End: 2024-01-24

## 2024-01-24 NOTE — CARE COORDINATION
SW mailed out local resources, including transportation, food gomez, Medicaid application.  Will reach out to pt to make sure he receives.

## 2024-02-02 ENCOUNTER — CARE COORDINATION (OUTPATIENT)
Dept: CARE COORDINATION | Age: 39
End: 2024-02-02

## 2024-02-02 NOTE — CARE COORDINATION
SW called pt to follow up with resources snet to him. Pt reports he did receive. Discussed additional needs or concerns. Pt stated, \"I am doing good, no other needs at this time.\" Advised pt to contact MAIKOL at [phone] for further concerns.

## 2024-02-28 ENCOUNTER — APPOINTMENT (OUTPATIENT)
Dept: CT IMAGING | Age: 39
End: 2024-02-28
Payer: COMMERCIAL

## 2024-02-28 ENCOUNTER — HOSPITAL ENCOUNTER (EMERGENCY)
Age: 39
Discharge: HOME | End: 2024-02-28
Attending: EMERGENCY MEDICINE
Payer: COMMERCIAL

## 2024-02-28 VITALS
TEMPERATURE: 98.2 F | BODY MASS INDEX: 25.18 KG/M2 | HEIGHT: 73 IN | DIASTOLIC BLOOD PRESSURE: 96 MMHG | HEART RATE: 54 BPM | SYSTOLIC BLOOD PRESSURE: 120 MMHG | RESPIRATION RATE: 19 BRPM | OXYGEN SATURATION: 100 % | WEIGHT: 190 LBS

## 2024-02-28 DIAGNOSIS — R25.3 MUSCLE TWITCHING: Primary | ICD-10-CM

## 2024-02-28 DIAGNOSIS — G25.81 RESTLESS LEG SYNDROME: ICD-10-CM

## 2024-02-28 DIAGNOSIS — H69.92 DYSFUNCTION OF LEFT EUSTACHIAN TUBE: ICD-10-CM

## 2024-02-28 LAB
ANION GAP SERPL CALC-SCNC: 12 MEQ/L (ref 8–16)
BASOPHILS ABSOLUTE: 0 THOU/MM3 (ref 0–0.1)
BASOPHILS NFR BLD AUTO: 0.4 %
BUN SERPL-MCNC: 6 MG/DL (ref 7–22)
CALCIUM SERPL-MCNC: 9.3 MG/DL (ref 8.5–10.5)
CHLORIDE SERPL-SCNC: 103 MEQ/L (ref 98–111)
CO2 SERPL-SCNC: 24 MEQ/L (ref 23–33)
CREAT SERPL-MCNC: 0.7 MG/DL (ref 0.4–1.2)
DEPRECATED RDW RBC AUTO: 48.3 FL (ref 35–45)
EOSINOPHIL NFR BLD AUTO: 0.7 %
EOSINOPHILS ABSOLUTE: 0 THOU/MM3 (ref 0–0.4)
ERYTHROCYTE [DISTWIDTH] IN BLOOD BY AUTOMATED COUNT: 15 % (ref 11.5–14.5)
FOLATE SERPL-MCNC: > 20 NG/ML (ref 4.8–24.2)
GFR SERPL CREATININE-BSD FRML MDRD: > 60 ML/MIN/1.73M2
GLUCOSE SERPL-MCNC: 127 MG/DL (ref 70–108)
HCT VFR BLD AUTO: 44.5 % (ref 42–52)
HGB BLD-MCNC: 15 GM/DL (ref 14–18)
IMM GRANULOCYTES # BLD AUTO: 0 THOU/MM3 (ref 0–0.07)
IMM GRANULOCYTES NFR BLD AUTO: 0 %
LYMPHOCYTES ABSOLUTE: 3.1 THOU/MM3 (ref 1–4.8)
LYMPHOCYTES NFR BLD AUTO: 54.6 %
MCH RBC QN AUTO: 29.4 PG (ref 26–33)
MCHC RBC AUTO-ENTMCNC: 33.7 GM/DL (ref 32.2–35.5)
MCV RBC AUTO: 87.1 FL (ref 80–94)
MONOCYTES ABSOLUTE: 0.4 THOU/MM3 (ref 0.4–1.3)
MONOCYTES NFR BLD AUTO: 7.5 %
NEUTROPHILS NFR BLD AUTO: 36.8 %
NRBC BLD AUTO-RTO: 0 /100 WBC
OSMOLALITY SERPL CALC.SUM OF ELEC: 276.7 MOSMOL/KG (ref 275–300)
PLATELET # BLD AUTO: 152 THOU/MM3 (ref 130–400)
PMV BLD AUTO: 10.8 FL (ref 9.4–12.4)
POTASSIUM SERPL-SCNC: 3.9 MEQ/L (ref 3.5–5.2)
RBC # BLD AUTO: 5.11 MILL/MM3 (ref 4.7–6.1)
SEGMENTED NEUTROPHILS ABSOLUTE COUNT: 2.1 THOU/MM3 (ref 1.8–7.7)
SODIUM SERPL-SCNC: 139 MEQ/L (ref 135–145)
T4 FREE SERPL-MCNC: 1.04 NG/DL (ref 0.93–1.68)
TSH SERPL DL<=0.005 MIU/L-ACNC: 1.96 UIU/ML (ref 0.4–4.2)
VIT B12 SERPL-MCNC: 527 PG/ML (ref 211–911)
WBC # BLD AUTO: 5.6 THOU/MM3 (ref 4.8–10.8)

## 2024-02-28 PROCEDURE — 36415 COLL VENOUS BLD VENIPUNCTURE: CPT

## 2024-02-28 PROCEDURE — 80048 BASIC METABOLIC PNL TOTAL CA: CPT

## 2024-02-28 PROCEDURE — 82746 ASSAY OF FOLIC ACID SERUM: CPT

## 2024-02-28 PROCEDURE — 84443 ASSAY THYROID STIM HORMONE: CPT

## 2024-02-28 PROCEDURE — 99284 EMERGENCY DEPT VISIT MOD MDM: CPT

## 2024-02-28 PROCEDURE — 85025 COMPLETE CBC W/AUTO DIFF WBC: CPT

## 2024-02-28 PROCEDURE — 70450 CT HEAD/BRAIN W/O DYE: CPT

## 2024-02-28 PROCEDURE — 82607 VITAMIN B-12: CPT

## 2024-02-28 PROCEDURE — 84439 ASSAY OF FREE THYROXINE: CPT

## 2024-02-28 PROCEDURE — 93005 ELECTROCARDIOGRAM TRACING: CPT | Performed by: EMERGENCY MEDICINE

## 2024-02-28 ASSESSMENT — PAIN - FUNCTIONAL ASSESSMENT
PAIN_FUNCTIONAL_ASSESSMENT: NONE - DENIES PAIN

## 2024-02-28 NOTE — ED TRIAGE NOTES
Pt presents to the ER with c/o shaking of his head and eyes. Pt states this has been happening intermittently for the last few months. Pt states today he about passed out. Pt denies seizures or neurologic disorders. Pt denies pain. Pt is alert and oriented, respirations even and unlabored. VSS

## 2024-02-29 LAB
EKG ATRIAL RATE: 73 BPM
EKG P AXIS: 58 DEGREES
EKG P-R INTERVAL: 160 MS
EKG Q-T INTERVAL: 400 MS
EKG QRS DURATION: 100 MS
EKG QTC CALCULATION (BAZETT): 440 MS
EKG R AXIS: 62 DEGREES
EKG T AXIS: 7 DEGREES
EKG VENTRICULAR RATE: 73 BPM

## 2024-02-29 PROCEDURE — 93010 ELECTROCARDIOGRAM REPORT: CPT | Performed by: INTERNAL MEDICINE

## 2024-02-29 NOTE — ED NOTES
Pt updated on POC. Pt resting in bed, respirations even and unlabored. No needs expressed at this time. Call light within reach. VSS

## 2024-02-29 NOTE — ED PROVIDER NOTES
Diagnoses Considered but I have low suspicion of:   Stroke             Pertinent Comorbid Conditions:    None    2)  Data Reviewed (none if left blank)          My Independent interpretations:     EKG:                  Rhythm: normal sinus           Rate: normal          Axis: normal          Ectopy: none          Conduction: normal          ST Segments: no acute change          T Waves: no acute change          Q Waves: none           Clinical Impression: Normal sinus rhythm with septal infarct, abnormal EKG    Imaging: CT scan of the head interpreted by the radiologist as no acute findings    Labs:      CBC thyroid function tests, BMP are all within normal limits                 Decision Rules/Clinical Scores utilized:  None                  Controlled Substance Monitoring:                   External Documentation Reviewed:         Previous patient encounter documents & history available on EMR was reviewed visits on the primary care office             See Formal Diagnostic Results above for the lab and radiology tests and orders.    3)  Treatment and Disposition:         ED Medications administered this visit:  (None if blank)       Medications - No data to display         ED Reassessment: Medically stable did not have any twitching while in the ER         Case discussed with consulting clinician: None         Shared Decision-Making was performed and disposition discussed with the        Patient/Family and questions answered          Social determinants of health impacting treatment or disposition:  NA         Code Status:  Full Code      Summary of Patient Presentation:      MDM  Number of Diagnoses or Management Options  Dysfunction of left eustachian tube: new, no workup  Muscle twitching: new, needed workup  Restless leg syndrome: new, needed workup     Amount and/or Complexity of Data Reviewed  Clinical lab tests: ordered and reviewed  Tests in the radiology section of CPT®: ordered and reviewed  Tests in

## 2024-03-14 ENCOUNTER — OFFICE VISIT (OUTPATIENT)
Dept: FAMILY MEDICINE CLINIC | Age: 39
End: 2024-03-14
Payer: COMMERCIAL

## 2024-03-14 VITALS
HEIGHT: 73 IN | DIASTOLIC BLOOD PRESSURE: 78 MMHG | BODY MASS INDEX: 30.83 KG/M2 | RESPIRATION RATE: 14 BRPM | HEART RATE: 74 BPM | WEIGHT: 232.6 LBS | OXYGEN SATURATION: 96 % | TEMPERATURE: 97.9 F | SYSTOLIC BLOOD PRESSURE: 116 MMHG

## 2024-03-14 DIAGNOSIS — F41.9 ANXIETY: ICD-10-CM

## 2024-03-14 DIAGNOSIS — M25.562 ARTHRALGIA OF BOTH KNEES: ICD-10-CM

## 2024-03-14 DIAGNOSIS — G51.4 FACIAL TWITCHING: Primary | ICD-10-CM

## 2024-03-14 DIAGNOSIS — M25.561 ARTHRALGIA OF BOTH KNEES: ICD-10-CM

## 2024-03-14 DIAGNOSIS — K21.9 GASTROESOPHAGEAL REFLUX DISEASE, UNSPECIFIED WHETHER ESOPHAGITIS PRESENT: ICD-10-CM

## 2024-03-14 PROBLEM — M48.02 CERVICAL SPINAL STENOSIS: Status: ACTIVE | Noted: 2024-03-14

## 2024-03-14 PROBLEM — S16.1XXA CERVICAL STRAIN: Status: ACTIVE | Noted: 2024-03-14

## 2024-03-14 PROCEDURE — 1036F TOBACCO NON-USER: CPT

## 2024-03-14 PROCEDURE — 99214 OFFICE O/P EST MOD 30 MIN: CPT

## 2024-03-14 PROCEDURE — G8484 FLU IMMUNIZE NO ADMIN: HCPCS

## 2024-03-14 PROCEDURE — G8427 DOCREV CUR MEDS BY ELIG CLIN: HCPCS

## 2024-03-14 PROCEDURE — G8417 CALC BMI ABV UP PARAM F/U: HCPCS

## 2024-03-14 RX ORDER — TIZANIDINE 2 MG/1
2 TABLET ORAL NIGHTLY PRN
Qty: 30 TABLET | Refills: 0 | Status: SHIPPED | OUTPATIENT
Start: 2024-03-14

## 2024-03-14 RX ORDER — MELOXICAM 15 MG/1
15 TABLET ORAL DAILY
Qty: 30 TABLET | Refills: 1 | Status: SHIPPED | OUTPATIENT
Start: 2024-03-14

## 2024-03-14 RX ORDER — PANTOPRAZOLE SODIUM 40 MG/1
40 TABLET, DELAYED RELEASE ORAL
Qty: 90 TABLET | Refills: 1 | Status: SHIPPED | OUTPATIENT
Start: 2024-03-14

## 2024-03-14 RX ORDER — HYDROCODONE BITARTRATE AND ACETAMINOPHEN 5; 325 MG/1; MG/1
TABLET ORAL
COMMUNITY
End: 2024-03-21

## 2024-03-14 RX ORDER — SERTRALINE HYDROCHLORIDE 25 MG/1
25 TABLET, FILM COATED ORAL DAILY
Qty: 90 TABLET | Refills: 1 | Status: SHIPPED | OUTPATIENT
Start: 2024-03-14

## 2024-03-14 RX ORDER — CIPROFLOXACIN 500 MG/1
500 TABLET, FILM COATED ORAL 2 TIMES DAILY
COMMUNITY
Start: 2024-02-26 | End: 2024-03-04

## 2024-03-14 ASSESSMENT — PATIENT HEALTH QUESTIONNAIRE - PHQ9
1. LITTLE INTEREST OR PLEASURE IN DOING THINGS: NOT AT ALL
2. FEELING DOWN, DEPRESSED OR HOPELESS: NOT AT ALL
SUM OF ALL RESPONSES TO PHQ QUESTIONS 1-9: 0
SUM OF ALL RESPONSES TO PHQ9 QUESTIONS 1 & 2: 0
SUM OF ALL RESPONSES TO PHQ QUESTIONS 1-9: 0

## 2024-03-14 NOTE — PROGRESS NOTES
Vikram Rodriguez is a 38 y.o. male who presents today for:  Chief Complaint   Patient presents with    Shaking     Pt states he is having head shaking/facial twitching.         HPI:   Vikram Rodriguez is 38 y.o. who presents today for follow up.      Patient presents today for follow-up.  Patient states that he is continuing to have facial twitching that seems to be getting worse.  He states is primarily over the right side of his face.  He states that tizanidine does help with this.  He would like to see a neurologist for this.    Patient also states that he has history of H. pylori he has been having worsening reflux symptoms.  He has been taking pantoprazole 40 mg daily.  He states that despite the pantoprazole he is still having symptoms.  He was previously treated when he was younger for H. pylori.    Patient states that he has not started Zoloft for anxiety, however he would like to restart.    No other concerns.      Objective:     Vitals:    03/14/24 0851   BP: 116/78   Site: Right Upper Arm   Pulse: 74   Resp: 14   Temp: 97.9 °F (36.6 °C)   TempSrc: Oral   SpO2: 96%   Weight: 105.5 kg (232 lb 9.6 oz)   Height: 1.854 m (6' 1\")       Wt Readings from Last 3 Encounters:   03/14/24 105.5 kg (232 lb 9.6 oz)   02/28/24 86.2 kg (190 lb)   01/15/24 105.1 kg (231 lb 12.8 oz)       BP Readings from Last 3 Encounters:   03/14/24 116/78   02/28/24 (!) 120/96   01/15/24 118/78       Lab Results   Component Value Date    WBC 5.6 02/28/2024    HGB 15.0 02/28/2024    HCT 44.5 02/28/2024    MCV 87.1 02/28/2024     02/28/2024     Lab Results   Component Value Date     02/28/2024    K 3.9 02/28/2024     02/28/2024    CO2 24 02/28/2024    BUN 6 (L) 02/28/2024    CREATININE 0.7 02/28/2024    GLUCOSE 127 (H) 02/28/2024    CALCIUM 9.3 02/28/2024    PROT 7.3 03/17/2023    LABALBU 4.2 03/17/2023    BILITOT 0.7 03/17/2023    ALKPHOS 69 03/17/2023    AST 22 03/17/2023    ALT 30 03/17/2023    LABGLOM >60 02/28/2024     Lab 
Joanne (Screen of Drug Use):    1) How many days in the past 12 months have you used drugs other than alcohol? #: 0  (positive if 7 or more)    2) How many days in the past 12 months have you used drugs more than you meant to? #: 0  (positive if 2 or more)    
pertinent history and exam findings with the resident. I also have seen the patient and performed key portions of the examination.  I agree with the documented assessment and plan as documented by the resident.        Lindy Saucedo MD 3/14/2024 3:27 PM

## 2024-03-21 ENCOUNTER — OFFICE VISIT (OUTPATIENT)
Dept: NEUROLOGY | Age: 39
End: 2024-03-21
Payer: COMMERCIAL

## 2024-03-21 VITALS
DIASTOLIC BLOOD PRESSURE: 79 MMHG | WEIGHT: 231 LBS | HEIGHT: 73 IN | HEART RATE: 68 BPM | BODY MASS INDEX: 30.62 KG/M2 | SYSTOLIC BLOOD PRESSURE: 138 MMHG | OXYGEN SATURATION: 98 %

## 2024-03-21 DIAGNOSIS — R42 DIZZINESS: ICD-10-CM

## 2024-03-21 DIAGNOSIS — R25.1 EPISODE OF SHAKING: Primary | ICD-10-CM

## 2024-03-21 PROCEDURE — G8484 FLU IMMUNIZE NO ADMIN: HCPCS | Performed by: PSYCHIATRY & NEUROLOGY

## 2024-03-21 PROCEDURE — G8417 CALC BMI ABV UP PARAM F/U: HCPCS | Performed by: PSYCHIATRY & NEUROLOGY

## 2024-03-21 PROCEDURE — 1036F TOBACCO NON-USER: CPT | Performed by: PSYCHIATRY & NEUROLOGY

## 2024-03-21 PROCEDURE — 99205 OFFICE O/P NEW HI 60 MIN: CPT | Performed by: PSYCHIATRY & NEUROLOGY

## 2024-03-21 PROCEDURE — G8427 DOCREV CUR MEDS BY ELIG CLIN: HCPCS | Performed by: PSYCHIATRY & NEUROLOGY

## 2024-03-25 ENCOUNTER — TELEPHONE (OUTPATIENT)
Dept: FAMILY MEDICINE CLINIC | Age: 39
End: 2024-03-25

## 2024-04-03 ENCOUNTER — OFFICE VISIT (OUTPATIENT)
Dept: FAMILY MEDICINE CLINIC | Age: 39
End: 2024-04-03
Payer: COMMERCIAL

## 2024-04-03 ENCOUNTER — NURSE ONLY (OUTPATIENT)
Dept: LAB | Age: 39
End: 2024-04-03

## 2024-04-03 VITALS
SYSTOLIC BLOOD PRESSURE: 124 MMHG | TEMPERATURE: 98.3 F | RESPIRATION RATE: 18 BRPM | WEIGHT: 228 LBS | DIASTOLIC BLOOD PRESSURE: 78 MMHG | HEIGHT: 73 IN | OXYGEN SATURATION: 97 % | BODY MASS INDEX: 30.22 KG/M2 | HEART RATE: 66 BPM

## 2024-04-03 DIAGNOSIS — R50.9 FEVER, UNSPECIFIED FEVER CAUSE: ICD-10-CM

## 2024-04-03 DIAGNOSIS — K21.9 GASTROESOPHAGEAL REFLUX DISEASE, UNSPECIFIED WHETHER ESOPHAGITIS PRESENT: ICD-10-CM

## 2024-04-03 DIAGNOSIS — G51.4 FACIAL TWITCHING: Primary | ICD-10-CM

## 2024-04-03 DIAGNOSIS — R42 DIZZINESS: ICD-10-CM

## 2024-04-03 DIAGNOSIS — F41.9 ANXIETY: ICD-10-CM

## 2024-04-03 DIAGNOSIS — R25.1 EPISODE OF SHAKING: ICD-10-CM

## 2024-04-03 LAB
25(OH)D3 SERPL-MCNC: 20 NG/ML (ref 30–100)
FERRITIN SERPL IA-MCNC: 190 NG/ML (ref 22–322)
FOLATE SERPL-MCNC: 14.4 NG/ML (ref 4.8–24.2)
IRON SATN MFR SERPL: 22 % (ref 20–50)
IRON SERPL-MCNC: 70 UG/DL (ref 65–195)
TIBC SERPL-MCNC: 317 UG/DL (ref 171–450)
VIT B12 SERPL-MCNC: 578 PG/ML (ref 211–911)

## 2024-04-03 PROCEDURE — G8417 CALC BMI ABV UP PARAM F/U: HCPCS

## 2024-04-03 PROCEDURE — G8427 DOCREV CUR MEDS BY ELIG CLIN: HCPCS

## 2024-04-03 PROCEDURE — 99214 OFFICE O/P EST MOD 30 MIN: CPT

## 2024-04-03 PROCEDURE — 1036F TOBACCO NON-USER: CPT

## 2024-04-03 RX ORDER — CYCLOBENZAPRINE HCL 10 MG
TABLET ORAL
COMMUNITY
Start: 2024-03-20

## 2024-04-03 NOTE — PROGRESS NOTES
Vikram Rodriguez is a 38 y.o. male who presents today for:  Chief Complaint   Patient presents with    Other     Patient in office today to speak to Dr. Davis about Typhoid Test. Patient thinks that he has Typhoid Fever.          HPI:   Vikram Rodriguez is 38 y.o. who presents today for follow up.      Patient presents today for follow-up.  Patient would like to be tested for typhoid fever.  Patient states he was diagnosed with this back in [place] and was treated for this previously.  He is concerned that this could be causing his symptoms.  He has had no fevers.  Continues to have facial twitching as well.    Patient states that anxiety is doing slightly better, however he would like to increase his dose.  He states he will be establishing with neurology regarding his eye twitching.  Objective:     Vitals:    04/03/24 1453   BP: 124/78   Site: Right Upper Arm   Position: Sitting   Cuff Size: Large Adult   Pulse: 66   Resp: 18   Temp: 98.3 °F (36.8 °C)   TempSrc: Oral   SpO2: 97%   Weight: 103.4 kg (228 lb)   Height: 1.854 m (6' 1\")       Wt Readings from Last 3 Encounters:   04/03/24 103.4 kg (228 lb)   03/21/24 104.8 kg (231 lb)   03/14/24 105.5 kg (232 lb 9.6 oz)       BP Readings from Last 3 Encounters:   04/03/24 124/78   03/21/24 138/79   03/14/24 116/78       Lab Results   Component Value Date    WBC 5.6 02/28/2024    HGB 15.0 02/28/2024    HCT 44.5 02/28/2024    MCV 87.1 02/28/2024     02/28/2024     Lab Results   Component Value Date     02/28/2024    K 3.9 02/28/2024     02/28/2024    CO2 24 02/28/2024    BUN 6 (L) 02/28/2024    CREATININE 0.7 02/28/2024    GLUCOSE 127 (H) 02/28/2024    CALCIUM 9.3 02/28/2024    PROT 7.3 03/17/2023    LABALBU 4.2 03/17/2023    BILITOT 0.7 03/17/2023    ALKPHOS 69 03/17/2023    AST 22 03/17/2023    ALT 30 03/17/2023    LABGLOM >60 02/28/2024     Lab Results   Component Value Date    TSH 1.960 02/28/2024    T4FREE 1.04 02/28/2024     No results found for:

## 2024-04-03 NOTE — PROGRESS NOTES
Attending Physician Note    I saw and evaluated the patient, performing the key elements of the service.  I discussed the findings, assessment and plan with the resident and agree with the resident's findings and plan as documented in the resident's note.  GC modifier added.    Brief summary:  Anxiety disorder - increase sertraline dose.    Fever, headache, hx typhoid fever - check blood culture.

## 2024-04-04 ENCOUNTER — TELEPHONE (OUTPATIENT)
Dept: NEUROLOGY | Age: 39
End: 2024-04-04

## 2024-04-04 LAB
ACB COMPLEX DNA BLD POS QL NAA+NON-PROBE: NOT DETECTED
B FRAGILIS DNA BLD POS QL NAA+NON-PROBE: NOT DETECTED
BLACTX-M ISLT/SPM QL: ABNORMAL
BLAIMP ISLT/SPM QL: ABNORMAL
BLAKPC ISLT/SPM QL: ABNORMAL
BLAOXA-48-LIKE ISLT/SPM QL: ABNORMAL
BLAVIM ISLT/SPM QL: ABNORMAL
BOTTLE TYPE: ABNORMAL
C ALBICANS DNA BLD POS QL NAA+NON-PROBE: NOT DETECTED
C AURIS DNA BLD POS QL NAA+NON-PROBE: NOT DETECTED
C GATTII+NEOFOR DNA BLD POS QL NAA+N-PRB: NOT DETECTED
C GLABRATA DNA BLD POS QL NAA+NON-PROBE: NOT DETECTED
C KRUSEI DNA BLD POS QL NAA+NON-PROBE: NOT DETECTED
C PARAP DNA BLD POS QL NAA+NON-PROBE: NOT DETECTED
C TROPICLS DNA BLD POS QL NAA+NON-PROBE: NOT DETECTED
COAG NEG STAPH DNA BLD QL NAA+PROBE: DETECTED
COLISTIN RES MCR-1 ISLT/SPM QL: ABNORMAL
E CLOAC COMP DNA BLD POS NAA+NON-PROBE: NOT DETECTED
E COLI DNA BLD POS QL NAA+NON-PROBE: NOT DETECTED
E FAECALIS DNA BLD POS QL NAA+NON-PROBE: NOT DETECTED
E FAECIUM DNA BLD POS QL NAA+NON-PROBE: NOT DETECTED
ENTEROBACTERALES DNA BLD POS NAA+N-PRB: NOT DETECTED
GP B STREP DNA SPEC QL NAA+PROBE: NOT DETECTED
GP B STREP DNA SPEC QL NAA+PROBE: NOT DETECTED
HAEM INFLU DNA BLD POS QL NAA+NON-PROBE: NOT DETECTED
K OXYTOCA DNA BLD POS QL NAA+NON-PROBE: NOT DETECTED
K OXYTOCA DNA BLD POS QL NAA+NON-PROBE: NOT DETECTED
KLEBSIELLA SP DNA BLD POS QL NAA+NON-PRB: NOT DETECTED
L MONOCYTOG DNA BLD POS QL NAA+NON-PROBE: NOT DETECTED
MECA ISLT/SPM QL: NOT DETECTED
MECA+MECC+MREJ ISLT/SPM QL: ABNORMAL
N MEN DNA BLD POS QL NAA+NON-PROBE: NOT DETECTED
NDM: ABNORMAL
P AERUGINOSA DNA BLD POS NAA+NON-PROBE: NOT DETECTED
PROTEUS SPP: NOT DETECTED
S AUREUS DNA BLD POS QL NAA+NON-PROBE: NOT DETECTED
S EPIDERMIDIS DNA BLD POS QL NAA+NON-PRB: DETECTED
S LUGDUNENSIS DNA BLD POS QL NAA+NON-PRB: NOT DETECTED
S MALTOPHILIA DNA BLD POS QL NAA+NON-PRB: NOT DETECTED
S MARCESCENS DNA BLD POS NAA+NON-PROBE: NOT DETECTED
S PYO DNA THROAT QL NAA+PROBE: NOT DETECTED
SALMONELLA DNA BLD POS QL NAA+NON-PROBE: NOT DETECTED
SOURCE OF BLOOD CULTURE: ABNORMAL
STREPTOCOCCUS DNA BLD QL NAA+PROBE: NOT DETECTED
VANA+VANB ISLT/SPM QL: ABNORMAL

## 2024-04-04 NOTE — TELEPHONE ENCOUNTER
----- Message from Paige L Leopold, APRN - CNP sent at 4/4/2024  7:47 AM EDT -----  Please let patient know his vitamin D level is low=20, please ask him to follow up with his PCP regarding this  Paige Leopold, CNP

## 2024-04-04 NOTE — TELEPHONE ENCOUNTER
Patient informed of lab results and verbalized understanding. Lab results sent to PCP for further management.

## 2024-04-05 ENCOUNTER — TELEPHONE (OUTPATIENT)
Dept: FAMILY MEDICINE CLINIC | Age: 39
End: 2024-04-05

## 2024-04-05 LAB
BACTERIA BLD AEROBE CULT: ABNORMAL
BACTERIA BLD AEROBE CULT: ABNORMAL
ORGANISM: ABNORMAL

## 2024-04-05 NOTE — TELEPHONE ENCOUNTER
----- Message from Dajuan Davis DO sent at 4/4/2024 10:23 PM EDT -----  Patient's culture has gram-positive cocci, bio fire shows Staph epidermidis, likely this is due to contaminant.  No other pathogens noted on blood culture.  
Pt informed and verbalized understanding.     
[[id number]]

## 2024-04-07 LAB — PYRIDOXAL PHOS SERPL-SCNC: 337.9 NMOL/L (ref 20–125)

## 2024-04-08 ENCOUNTER — HOSPITAL ENCOUNTER (OUTPATIENT)
Dept: MRI IMAGING | Age: 39
Discharge: HOME | End: 2024-04-08
Payer: COMMERCIAL

## 2024-04-08 ENCOUNTER — TELEPHONE (OUTPATIENT)
Dept: NEUROLOGY | Age: 39
End: 2024-04-08

## 2024-04-08 ENCOUNTER — HOSPITAL ENCOUNTER (OUTPATIENT)
Dept: NEUROLOGY | Age: 39
Discharge: HOME | End: 2024-04-08
Payer: COMMERCIAL

## 2024-04-08 DIAGNOSIS — R25.1 EPISODE OF SHAKING: Primary | ICD-10-CM

## 2024-04-08 DIAGNOSIS — R42 DIZZINESS: ICD-10-CM

## 2024-04-08 DIAGNOSIS — R25.1 EPISODE OF SHAKING: ICD-10-CM

## 2024-04-08 PROCEDURE — 70551 MRI BRAIN STEM W/O DYE: CPT

## 2024-04-08 PROCEDURE — 95816 EEG AWAKE AND DROWSY: CPT

## 2024-04-08 NOTE — TELEPHONE ENCOUNTER
----- Message from Birdie Lyons MD sent at 4/7/2024 10:09 PM EDT -----  Please ask patient to stop any B6 containing supplements. The level is potentially neuro toxic = 337. Repeat level in 3 months.  Birdie Lyons MD

## 2024-04-08 NOTE — TELEPHONE ENCOUNTER
----- Message from Birdie Lyons MD sent at 4/8/2024 10:08 AM EDT -----  Please let patient know MRI Brain is unremarkable.   Birdie Lyons MD 10:08 AM

## 2024-04-08 NOTE — PROGRESS NOTES
[facility]     Neurodiagnostic Laboratory Technician worksheet       EEG Date: 2024    Name: Vikram Rodriguez   : 1985   Age: 38 y.o.  SEX: male    Room: OP    MRN: [id number]     CSN: [id number]    Ordering Provider: Leopold, CNP  EEG Number: [id number] Time of Test:  09    Hand: -   Sedation: No    H.V. Done: Yes with good effort Photic: Yes    Sleep: No   Drowsy: No   Sleep Deprived: No    Seizures observed: no    Mentality: alert       Clinical History: head and eye shaking while walking    Past Medical History:       Diagnosis Date    Pyloric ulcer     Restless legs syndrome (RLS)          Prior to Admission medications    Medication Sig Start Date End Date Taking? Authorizing Provider   cyclobenzaprine (FLEXERIL) 10 MG tablet TAKE 1 TABLET BY MOUTH THREE TIMES DAILY FOR 7 DAYS 3/20/24   Lolita Short MD   sertraline (ZOLOFT) 50 MG tablet Take 1 tablet by mouth daily 4/3/24   Dajuan Davis DO   pantoprazole (PROTONIX) 40 MG tablet Take 1 tablet by mouth every morning (before breakfast) 3/14/24   Dajuan Davis DO   tiZANidine (ZANAFLEX) 2 MG tablet Take 1 tablet by mouth nightly as needed (muscle spasm and twitching) 3/14/24   Dajuan Daivs DO   meloxicam (MOBIC) 15 MG tablet Take 1 tablet by mouth daily 3/14/24   Dajuan Davis DO   fluticasone (FLONASE) 50 MCG/ACT nasal spray 2 sprays by Each Nostril route daily 1/15/24   Dajuan Davis DO   Multiple Vitamin (MULTIVITAMIN ADULT PO) Take by mouth    Lolita Short MD       Technician: Monique Disla RCP 2024

## 2024-04-09 NOTE — PROCEDURES
[address] [address]                       ELECTROENCEPHALOGRAM REPORT      PATIENT NAME: LEONIDES TORRES                     : 1985  MED REC NO: [id number]                       ROOM:   ACCOUNT NO: [id number]                       ADMIT DATE: 2024  PROVIDER: Birdie Lyons MD      DATE OF EE2024    REFERRING PHYSICIAN:  PAIGE L LEOPOLD    CLINICAL HISTORY:  38-year-old male presenting with head and eye shaking.    MEDICATIONS:  Listed are Flexeril, Zoloft, Protonix, Zanaflex, Mobic, and Flonase.    This is a routine 20-minute EEG recording using the [employer] 10/20 system on [employer] workstation.  Automated spike and seizure detection algorithms were applied.  The patient is described as alert.    Background rhythm activity is noted to be 10 hertz in the posterior parietal area, symmetric, well-modulated, attenuates with eye opening.  Hyperventilation and photic stimulation were performed without abnormality.  The lead and muscle artifact were noted.  There was no evidence of epileptiform activity appreciated.  No clinical seizures were observed.    IMPRESSION:  This is a normal electroencephalogram.  There was no evidence of epileptiform activity appreciated.          BIRDIE LYONS MD      D:  2024 09:02:29     T:  2024 09:14:24     ASA/AQS  Job #:  [id number]     Doc#:  [id number]

## 2024-04-09 NOTE — TELEPHONE ENCOUNTER
Patient informed and verbalized understanding. Patient will have Vit. B 6 lab repeated in 3 months at a [facility].

## 2024-05-03 NOTE — TELEPHONE ENCOUNTER
Will address this issue at next appointment.    Future Appointments   Date Time Provider Department Center   5/6/2024 11:30 AM Leopold, Paige L, APRN - CNP N [facility] Neurology -   5/15/2024  1:00 PM Carlos Castle DO [facility]  [facility] [place]     Electronically signed by Carlos Castle DO on 5/3/2024 at 9:00 AM

## 2024-05-06 DIAGNOSIS — M25.562 ARTHRALGIA OF BOTH KNEES: ICD-10-CM

## 2024-05-06 DIAGNOSIS — M25.561 ARTHRALGIA OF BOTH KNEES: ICD-10-CM

## 2024-05-07 RX ORDER — MELOXICAM 15 MG/1
15 TABLET ORAL DAILY
Qty: 30 TABLET | Refills: 0 | OUTPATIENT
Start: 2024-05-07

## 2024-05-07 NOTE — TELEPHONE ENCOUNTER
Patient's last appointment was : 4/3/2024 with our   Patient's next appointment is : 5/15/2024 with our Dr. Riddle  Last refilled on: 3-14-24  Which pharmacy does the script need sent to: [facility]      No results found for: \"LABA1C\"  No results found for: \"CHOL\", \"TRIG\", \"HDL\", \"LDLDIRECT\"  Lab Results   Component Value Date     02/28/2024    K 3.9 02/28/2024     02/28/2024    CO2 24 02/28/2024    BUN 6 (L) 02/28/2024    CREATININE 0.7 02/28/2024    GLUCOSE 127 (H) 02/28/2024    CALCIUM 9.3 02/28/2024    BILITOT 0.7 03/17/2023    ALKPHOS 69 03/17/2023    AST 22 03/17/2023    ALT 30 03/17/2023    LABGLOM >60 02/28/2024     Lab Results   Component Value Date    TSH 1.960 02/28/2024    T4FREE 1.04 02/28/2024     Lab Results   Component Value Date    WBC 5.6 02/28/2024    HGB 15.0 02/28/2024    HCT 44.5 02/28/2024    MCV 87.1 02/28/2024     02/28/2024

## 2024-07-29 ENCOUNTER — OFFICE VISIT (OUTPATIENT)
Dept: FAMILY MEDICINE CLINIC | Age: 39
End: 2024-07-29
Payer: COMMERCIAL

## 2024-07-29 VITALS
HEART RATE: 78 BPM | RESPIRATION RATE: 20 BRPM | DIASTOLIC BLOOD PRESSURE: 80 MMHG | BODY MASS INDEX: 30.18 KG/M2 | SYSTOLIC BLOOD PRESSURE: 138 MMHG | HEIGHT: 74 IN | OXYGEN SATURATION: 94 % | TEMPERATURE: 98.2 F | WEIGHT: 235.2 LBS

## 2024-07-29 DIAGNOSIS — E55.9 VITAMIN D DEFICIENCY: ICD-10-CM

## 2024-07-29 DIAGNOSIS — H65.90 FLUID COLLECTION OF MIDDLE EAR: ICD-10-CM

## 2024-07-29 DIAGNOSIS — K21.9 GASTROESOPHAGEAL REFLUX DISEASE, UNSPECIFIED WHETHER ESOPHAGITIS PRESENT: Primary | ICD-10-CM

## 2024-07-29 DIAGNOSIS — H93.12 TINNITUS OF LEFT EAR: ICD-10-CM

## 2024-07-29 DIAGNOSIS — Z87.11 HX OF PEPTIC ULCER: ICD-10-CM

## 2024-07-29 PROCEDURE — 99214 OFFICE O/P EST MOD 30 MIN: CPT

## 2024-07-29 RX ORDER — FAMOTIDINE 20 MG/1
20 TABLET, FILM COATED ORAL NIGHTLY PRN
Qty: 90 TABLET | Refills: 0 | Status: SHIPPED | OUTPATIENT
Start: 2024-07-29 | End: 2024-10-27

## 2024-07-29 RX ORDER — MULTIVITAMIN WITH IRON
TABLET ORAL
COMMUNITY

## 2024-07-29 RX ORDER — FLUTICASONE PROPIONATE 50 MCG
2 SPRAY, SUSPENSION (ML) NASAL DAILY
Qty: 16 G | Refills: 0 | Status: SHIPPED | OUTPATIENT
Start: 2024-07-29

## 2024-07-29 SDOH — ECONOMIC STABILITY: INCOME INSECURITY: HOW HARD IS IT FOR YOU TO PAY FOR THE VERY BASICS LIKE FOOD, HOUSING, MEDICAL CARE, AND HEATING?: NOT HARD AT ALL

## 2024-07-29 SDOH — ECONOMIC STABILITY: FOOD INSECURITY: WITHIN THE PAST 12 MONTHS, YOU WORRIED THAT YOUR FOOD WOULD RUN OUT BEFORE YOU GOT MONEY TO BUY MORE.: NEVER TRUE

## 2024-07-29 SDOH — ECONOMIC STABILITY: FOOD INSECURITY: WITHIN THE PAST 12 MONTHS, THE FOOD YOU BOUGHT JUST DIDN'T LAST AND YOU DIDN'T HAVE MONEY TO GET MORE.: NEVER TRUE

## 2024-07-29 NOTE — PROGRESS NOTES
Patient:Vikram Rodriguez  YOB: 1985   MRN:[id number]    Subjective   39 y.o. male who presents for the following: pain (Knee pain and ear buzzing //Balance problems )    Patient reports Tinnitus. Following with Neurology.    Knee Pain   There was no injury mechanism. The pain is present in the right knee and left knee. The pain is moderate. The pain has been Fluctuating since onset. Pertinent negatives include no muscle weakness. He reports no foreign bodies present. He has tried NSAIDs for the symptoms. The treatment provided moderate relief.     Review of Systems   Constitutional:  Positive for fatigue.   HENT:  Negative for hearing loss (buzzing/tinnitus).    Eyes:  Negative for visual disturbance.   Genitourinary:  Negative for difficulty urinating.   Musculoskeletal:  Positive for arthralgias, gait problem (balance issues) and myalgias.   Skin:  Negative for wound.   Neurological:  Positive for light-headedness. Negative for dizziness and seizures.   Psychiatric/Behavioral:  Positive for sleep disturbance (restless legs). Negative for confusion.      PMHx: He has a past medical history of Pyloric ulcer and Restless legs syndrome (RLS).    Objective     Vitals:    07/29/24 1137   BP: 138/80   Site: Right Lower Arm   Position: Sitting   Pulse: 78   Resp: 20   Temp: 98.2 °F (36.8 °C)   TempSrc: Oral   SpO2: 94%   Weight: 106.7 kg (235 lb 3.2 oz)   Height: 1.88 m (6' 2\")   Body mass index is 30.2 kg/m².    Physical Exam  Constitutional:       General: He is not in acute distress.     Appearance: Normal appearance. He is not ill-appearing, toxic-appearing or diaphoretic.   HENT:      Head: Normocephalic and atraumatic.      Right Ear: Hearing, ear canal and external ear normal. No middle ear effusion. There is no impacted cerumen (increased amount of wax, no impaction).      Left Ear: Hearing, ear canal and external ear normal. A middle ear effusion (mild) is present. There is no impacted cerumen.

## 2024-07-29 NOTE — PATIENT INSTRUCTIONS
disabilities and those looking for long term care and/or in home waiver care.   Phone Number:  [phone]  Website: [email or web address]://[employer]/services/medical-assistance    [facility] Agency on Aging:  What they offer: Aging and disability resource center, care management/coordination, transportation, wellness and prevention.  Phone Number: [phone]        Utility  [facility]:  What they do: Help pay rent, utilities & internet bills.  Phone Number: 709.742.6861  Website: https://[employer]/emergency-services/rent-assistance      [facility] [place]:  What they do: They offer Utility assistance  Phone Number: 873.317.5080   Website: https://[email or web address]/[facility]/[place]/equip-families    [employer]:  What they offer: Temporary cash assistance to families to pay immediate needs while the adults of the families prepare and search for jobs.   Phone Number: [phone]  Website: https://[employer]/services/cash-assistance

## 2024-07-29 NOTE — PROGRESS NOTES
S: 39 y.o. male with   Chief Complaint   Patient presents with    pain     Knee pain and ear buzzing     Balance problems        L sided tinnitus  Chronic joint pains, some restless symptoms    Reviewed hx and ROS in detail with resident prior to exam.  See notes for additional details.     BP Readings from Last 3 Encounters:   07/29/24 138/80   04/03/24 124/78   03/21/24 138/79     Wt Readings from Last 3 Encounters:   07/29/24 106.7 kg (235 lb 3.2 oz)   04/03/24 103.4 kg (228 lb)   03/21/24 104.8 kg (231 lb)           O: VS:  height is 1.88 m (6' 2\") and weight is 106.7 kg (235 lb 3.2 oz). His oral temperature is 98.2 °F (36.8 °C). His blood pressure is 138/80 and his pulse is 78. His respiration is 20 and oxygen saturation is 94%.      Diagnosis Orders   1. Gastroesophageal reflux disease, unspecified whether esophagitis present        2. Hx of peptic ulcer        3. Vitamin D deficiency        4. Tinnitus of left ear        5. Fluid collection of middle ear            Health Maintenance Due   Topic Date Due    Hepatitis B vaccine (1 of 3 - 3-dose series) Never done    COVID-19 Vaccine (1) Never done    Varicella vaccine (1 of 2 - 2-dose childhood series) Never done    HIV screen  Never done    Hepatitis C screen  Never done    DTaP/Tdap/Td vaccine (1 - Tdap) Never done    Diabetes screen  Never done         Attending Physician Statement  I have discussed the case, including pertinent history and exam findings with the resident. I also have seen the patient and performed key portions of the examination.  I agree with the documented assessment and plan as documented by the resident.  GC modifier added to this encounter      Katelyn Ann Leopold, MD 7/29/2024 12:12 PM

## 2024-07-30 ENCOUNTER — LAB (OUTPATIENT)
Dept: LAB | Age: 39
End: 2024-07-30

## 2024-07-30 DIAGNOSIS — E55.9 VITAMIN D DEFICIENCY: ICD-10-CM

## 2024-07-30 DIAGNOSIS — H93.12 TINNITUS OF LEFT EAR: ICD-10-CM

## 2024-07-30 DIAGNOSIS — R42 DIZZINESS: ICD-10-CM

## 2024-07-30 DIAGNOSIS — R25.1 EPISODE OF SHAKING: ICD-10-CM

## 2024-07-30 LAB
25(OH)D3 SERPL-MCNC: 62 NG/ML (ref 30–100)
MAGNESIUM SERPL-MCNC: 2 MG/DL (ref 1.6–2.4)

## 2024-07-31 PROBLEM — K21.9 GASTROESOPHAGEAL REFLUX DISEASE: Status: ACTIVE | Noted: 2024-07-31

## 2024-07-31 PROBLEM — H93.12 TINNITUS OF LEFT EAR: Status: ACTIVE | Noted: 2024-07-31

## 2024-07-31 PROBLEM — Z87.11 HX OF PEPTIC ULCER: Status: ACTIVE | Noted: 2024-07-31

## 2024-07-31 NOTE — ASSESSMENT & PLAN NOTE
Fluid in middle ear; will Tx with flonase (refilled as below). Will also complete workup for possible neurological etiology; see below.

## 2024-07-31 NOTE — ASSESSMENT & PLAN NOTE
Asymptomatic, continue current medications, continue current treatment plan, medication adherence emphasized, and lifestyle modifications recommended. Will need monitored by specialist- will send referral. Patient instructions: Patients with GERD should avoid tomato products, coffee, chocolate, citrus, cigarettes, alcohol, and capsaicin containing / spicy foods as well as avoid sleeping on their right side or eating large meals within 2 hours of bedtime.

## 2024-08-03 LAB — PYRIDOXAL PHOS SERPL-SCNC: 73.6 NMOL/L (ref 20–125)

## 2024-10-29 ENCOUNTER — HOSPITAL ENCOUNTER (EMERGENCY)
Age: 39
Discharge: HOME | End: 2024-10-29
Attending: EMERGENCY MEDICINE

## 2024-10-29 ENCOUNTER — APPOINTMENT (OUTPATIENT)
Dept: GENERAL RADIOLOGY | Age: 39
End: 2024-10-29

## 2024-10-29 VITALS
BODY MASS INDEX: 25.18 KG/M2 | WEIGHT: 190 LBS | TEMPERATURE: 97.6 F | DIASTOLIC BLOOD PRESSURE: 83 MMHG | HEIGHT: 73 IN | RESPIRATION RATE: 19 BRPM | HEART RATE: 55 BPM | SYSTOLIC BLOOD PRESSURE: 151 MMHG | OXYGEN SATURATION: 98 %

## 2024-10-29 DIAGNOSIS — R06.00 DYSPNEA, UNSPECIFIED TYPE: ICD-10-CM

## 2024-10-29 DIAGNOSIS — J06.9 ACUTE UPPER RESPIRATORY INFECTION: Primary | ICD-10-CM

## 2024-10-29 LAB
ANION GAP SERPL CALC-SCNC: 10 MEQ/L (ref 8–16)
BASOPHILS ABSOLUTE: 0 THOU/MM3 (ref 0–0.1)
BASOPHILS NFR BLD AUTO: 0.3 %
BUN SERPL-MCNC: 6 MG/DL (ref 7–22)
CALCIUM SERPL-MCNC: 9 MG/DL (ref 8.5–10.5)
CHLORIDE SERPL-SCNC: 104 MEQ/L (ref 98–111)
CO2 SERPL-SCNC: 26 MEQ/L (ref 23–33)
CREAT SERPL-MCNC: 0.9 MG/DL (ref 0.4–1.2)
DEPRECATED RDW RBC AUTO: 47.5 FL (ref 35–45)
EKG ATRIAL RATE: 66 BPM
EKG P AXIS: 55 DEGREES
EKG P-R INTERVAL: 160 MS
EKG Q-T INTERVAL: 402 MS
EKG QRS DURATION: 96 MS
EKG QTC CALCULATION (BAZETT): 421 MS
EKG R AXIS: 54 DEGREES
EKG T AXIS: -16 DEGREES
EKG VENTRICULAR RATE: 66 BPM
EOSINOPHIL NFR BLD AUTO: 3 %
EOSINOPHILS ABSOLUTE: 0.2 THOU/MM3 (ref 0–0.4)
ERYTHROCYTE [DISTWIDTH] IN BLOOD BY AUTOMATED COUNT: 15.4 % (ref 11.5–14.5)
FLUAV RNA RESP QL NAA+PROBE: NOT DETECTED
FLUBV RNA RESP QL NAA+PROBE: NOT DETECTED
GFR SERPL CREATININE-BSD FRML MDRD: > 90 ML/MIN/1.73M2
GLUCOSE SERPL-MCNC: 114 MG/DL (ref 70–108)
HCT VFR BLD AUTO: 42.6 % (ref 42–52)
HGB BLD-MCNC: 14.3 GM/DL (ref 14–18)
IMM GRANULOCYTES # BLD AUTO: 0.01 THOU/MM3 (ref 0–0.07)
IMM GRANULOCYTES NFR BLD AUTO: 0.2 %
LYMPHOCYTES ABSOLUTE: 2.5 THOU/MM3 (ref 1–4.8)
LYMPHOCYTES NFR BLD AUTO: 42.6 %
MCH RBC QN AUTO: 28.4 PG (ref 26–33)
MCHC RBC AUTO-ENTMCNC: 33.6 GM/DL (ref 32.2–35.5)
MCV RBC AUTO: 84.7 FL (ref 80–94)
MONOCYTES ABSOLUTE: 0.5 THOU/MM3 (ref 0.4–1.3)
MONOCYTES NFR BLD AUTO: 8.3 %
NEUTROPHILS ABSOLUTE: 2.7 THOU/MM3 (ref 1.8–7.7)
NEUTROPHILS NFR BLD AUTO: 45.6 %
NRBC BLD AUTO-RTO: 0 /100 WBC
OSMOLALITY SERPL CALC.SUM OF ELEC: 277.9 MOSMOL/KG (ref 275–300)
PLATELET # BLD AUTO: 166 THOU/MM3 (ref 130–400)
PMV BLD AUTO: 10.5 FL (ref 9.4–12.4)
POTASSIUM SERPL-SCNC: 3.8 MEQ/L (ref 3.5–5.2)
RBC # BLD AUTO: 5.03 MILL/MM3 (ref 4.7–6.1)
SARS-COV-2 RNA RESP QL NAA+PROBE: NOT DETECTED
SODIUM SERPL-SCNC: 140 MEQ/L (ref 135–145)
TROPONIN, HIGH SENSITIVITY: 10 NG/L (ref 0–12)
WBC # BLD AUTO: 5.9 THOU/MM3 (ref 4.8–10.8)

## 2024-10-29 PROCEDURE — 71046 X-RAY EXAM CHEST 2 VIEWS: CPT

## 2024-10-29 PROCEDURE — 84484 ASSAY OF TROPONIN QUANT: CPT

## 2024-10-29 PROCEDURE — 99285 EMERGENCY DEPT VISIT HI MDM: CPT

## 2024-10-29 PROCEDURE — 85025 COMPLETE CBC W/AUTO DIFF WBC: CPT

## 2024-10-29 PROCEDURE — 93005 ELECTROCARDIOGRAM TRACING: CPT | Performed by: EMERGENCY MEDICINE

## 2024-10-29 PROCEDURE — 36415 COLL VENOUS BLD VENIPUNCTURE: CPT

## 2024-10-29 PROCEDURE — 87636 SARSCOV2 & INF A&B AMP PRB: CPT

## 2024-10-29 PROCEDURE — 80048 BASIC METABOLIC PNL TOTAL CA: CPT

## 2024-10-29 ASSESSMENT — PAIN - FUNCTIONAL ASSESSMENT: PAIN_FUNCTIONAL_ASSESSMENT: NONE - DENIES PAIN

## 2024-10-30 NOTE — DISCHARGE INSTRUCTIONS
Return to the Emergency Department immediately if you develop shortness of breath, chest pain, vomiting, high fevers , or you have any other concerns.  Please follow up with your primary care doctor in 2-3 days.\

## 2024-10-30 NOTE — ED PROVIDER NOTES
[facility] EMERGENCY DEPT      EMERGENCY MEDICINE     Pt Name: Vikram Rodriguez  MRN: [id number]  Birthdate 1985  Date of evaluation: 10/29/2024  Provider: KAVEH ZENDEJAS DO, FACEP    CHIEF COMPLAINT       Chief Complaint   Patient presents with    Fatigue    Nausea     HISTORY OF PRESENT ILLNESS   Vikram Rodriguez is a pleasant 39 y.o. male who presents to the emergency department for evaluation of cough, rhinorrhea, some chills, generalized weakness, and myalgias.  Symptoms started for the last 4 days.  Also feels some swishing sensation in both ears.  Unsure of any fever at home.  Today he felt somewhat short of breath for period of time, this seems to have resolved.  No history of VTE, no new lower extremity pain or swelling.  No history of pneumonia.  No recent history of travel.  Positive sick contacts.  He has had some mild nausea but no vomiting    PASTMEDICAL HISTORY/Co-Morbid Conditions:     Past Medical History:   Diagnosis Date    Pyloric ulcer     Restless legs syndrome (RLS)        Patient Active Problem List   Diagnosis Code    Cervical spinal stenosis M48.02    Cervical strain S16.1XXA    Gastroesophageal reflux disease K21.9    Hx of peptic ulcer Z87.11    Tinnitus of left ear H93.12     SURGICAL HISTORY     History reviewed. No pertinent surgical history.    CURRENT MEDICATIONS       Previous Medications    COD LIVER OIL 1250-135 UNITS CAPS    Take by mouth    CYCLOBENZAPRINE (FLEXERIL) 10 MG TABLET    TAKE 1 TABLET BY MOUTH THREE TIMES DAILY FOR 7 DAYS    FAMOTIDINE (PEPCID) 20 MG TABLET    Take 1 tablet by mouth nightly as needed (GERD)    FLUTICASONE (FLONASE) 50 MCG/ACT NASAL SPRAY    2 sprays by Each Nostril route daily    MELOXICAM (MOBIC) 15 MG TABLET    Take 1 tablet by mouth daily    MULTIPLE VITAMIN (MULTIVITAMIN ADULT PO)    Take by mouth       ALLERGIES     has No Known Allergies.    FAMILY HISTORY     He indicated that his mother is alive. He indicated that his father is .

## 2024-10-30 NOTE — ED TRIAGE NOTES
Pt presents to ED with complaints of fatigue and nausea. Pt states symptoms started yesterday morning. Pt states he zinc with Vitamin C and cough syrup ~1 hour before coming to the ED. Pt denies any chest pain. EKG obtained. Vital signs assessed.

## 2025-02-13 DIAGNOSIS — H65.90 FLUID COLLECTION OF MIDDLE EAR: ICD-10-CM

## 2025-02-13 DIAGNOSIS — M25.562 ARTHRALGIA OF BOTH KNEES: ICD-10-CM

## 2025-02-13 DIAGNOSIS — Z87.11 HX OF PEPTIC ULCER: ICD-10-CM

## 2025-02-13 DIAGNOSIS — K21.9 GASTROESOPHAGEAL REFLUX DISEASE, UNSPECIFIED WHETHER ESOPHAGITIS PRESENT: ICD-10-CM

## 2025-02-13 DIAGNOSIS — M25.561 ARTHRALGIA OF BOTH KNEES: ICD-10-CM

## 2025-02-13 RX ORDER — FAMOTIDINE 20 MG/1
20 TABLET, FILM COATED ORAL NIGHTLY PRN
Qty: 90 TABLET | Refills: 0 | Status: SHIPPED | OUTPATIENT
Start: 2025-02-13 | End: 2025-02-14

## 2025-02-13 RX ORDER — MELOXICAM 15 MG/1
15 TABLET ORAL DAILY
Qty: 30 TABLET | Refills: 1 | Status: SHIPPED | OUTPATIENT
Start: 2025-02-13 | End: 2025-02-14

## 2025-02-13 RX ORDER — FLUTICASONE PROPIONATE 50 MCG
2 SPRAY, SUSPENSION (ML) NASAL DAILY
Qty: 16 G | Refills: 0 | Status: SHIPPED | OUTPATIENT
Start: 2025-02-13 | End: 2025-02-14

## 2025-02-13 NOTE — TELEPHONE ENCOUNTER
Patient in office asking for medication refill .  Please advise .            No results found for: \"LABA1C\"  No results found for: \"CHOL\", \"TRIG\", \"HDL\", \"LDLDIRECT\"  Lab Results   Component Value Date     10/29/2024    K 3.8 10/29/2024     10/29/2024    CO2 26 10/29/2024    BUN 6 (L) 10/29/2024    CREATININE 0.9 10/29/2024    GLUCOSE 114 (H) 10/29/2024    CALCIUM 9.0 10/29/2024    BILITOT 0.7 03/17/2023    ALKPHOS 69 03/17/2023    AST 22 03/17/2023    ALT 30 03/17/2023    LABGLOM > 90 10/29/2024     Lab Results   Component Value Date    TSH 1.960 02/28/2024    T4FREE 1.04 02/28/2024     Lab Results   Component Value Date    WBC 5.9 10/29/2024    HGB 14.3 10/29/2024    HCT 42.6 10/29/2024    MCV 84.7 10/29/2024     10/29/2024

## 2025-02-14 ENCOUNTER — HOSPITAL ENCOUNTER (EMERGENCY)
Age: 40
Discharge: HOME | End: 2025-02-14
Payer: COMMERCIAL

## 2025-02-14 VITALS
BODY MASS INDEX: 31.45 KG/M2 | SYSTOLIC BLOOD PRESSURE: 119 MMHG | WEIGHT: 238.4 LBS | HEART RATE: 65 BPM | RESPIRATION RATE: 16 BRPM | DIASTOLIC BLOOD PRESSURE: 84 MMHG | OXYGEN SATURATION: 98 % | TEMPERATURE: 98.2 F

## 2025-02-14 DIAGNOSIS — J06.9 UPPER RESPIRATORY TRACT INFECTION, UNSPECIFIED TYPE: Primary | ICD-10-CM

## 2025-02-14 LAB
FLUAV AG SPEC QL: NEGATIVE
FLUBV AG SPEC QL: NEGATIVE

## 2025-02-14 PROCEDURE — 99213 OFFICE O/P EST LOW 20 MIN: CPT

## 2025-02-14 PROCEDURE — 87804 INFLUENZA ASSAY W/OPTIC: CPT

## 2025-02-14 PROCEDURE — 99213 OFFICE O/P EST LOW 20 MIN: CPT | Performed by: NURSE PRACTITIONER

## 2025-02-14 RX ORDER — AZITHROMYCIN 250 MG/1
TABLET, FILM COATED ORAL
Qty: 1 PACKET | Refills: 0 | Status: SHIPPED | OUTPATIENT
Start: 2025-02-14 | End: 2025-02-18

## 2025-02-14 RX ORDER — OMEPRAZOLE 40 MG/1
CAPSULE, DELAYED RELEASE ORAL
COMMUNITY
Start: 2024-12-27

## 2025-02-14 RX ORDER — BENZONATATE 200 MG/1
200 CAPSULE ORAL 3 TIMES DAILY PRN
Qty: 30 CAPSULE | Refills: 0 | Status: SHIPPED | OUTPATIENT
Start: 2025-02-14 | End: 2025-02-24

## 2025-02-14 ASSESSMENT — ENCOUNTER SYMPTOMS
VOMITING: 0
RHINORRHEA: 1
SHORTNESS OF BREATH: 0
NAUSEA: 0
CHEST TIGHTNESS: 0
SORE THROAT: 0
COUGH: 1
DIARRHEA: 0

## 2025-02-14 ASSESSMENT — PAIN - FUNCTIONAL ASSESSMENT
PAIN_FUNCTIONAL_ASSESSMENT: ACTIVITIES ARE NOT PREVENTED
PAIN_FUNCTIONAL_ASSESSMENT: 0-10

## 2025-02-14 ASSESSMENT — PAIN DESCRIPTION - LOCATION: LOCATION: GENERALIZED

## 2025-02-14 ASSESSMENT — PAIN SCALES - GENERAL: PAINLEVEL_OUTOF10: 7

## 2025-02-14 ASSESSMENT — PAIN DESCRIPTION - FREQUENCY: FREQUENCY: CONTINUOUS

## 2025-02-14 NOTE — ED TRIAGE NOTES
Vikram arrives to room with complaint of  weak, body aches, headache, felt  warm, cough bilateral ear pain, bilateral eye burning,   symptoms started 5 days ago.       OTC claritin, tylenol, last dose yesterday    Work note

## 2025-02-14 NOTE — ED PROVIDER NOTES
[facility] URGENT CARE  Urgent Care Encounter       CHIEF COMPLAINT       Chief Complaint   Patient presents with    Generalized Body Aches    Headache       Nurses Notes reviewed and I agree except as noted in the HPI.  HISTORY OF PRESENT ILLNESS   Vikram Rodriguez is a 39 y.o. male who presents to the was at urgent care for evaluation of headache.  Reports symptoms started roughly 5 to 6 days ago.  Reports congestion, rhinorrhea, postnasal drainage, cough, fatigue, myalgia, and headache.  Denies known exposure to someone ill.    The history is provided by the patient. No  was used.       REVIEW OF SYSTEMS     Review of Systems   Constitutional:  Positive for fatigue. Negative for activity change, appetite change, chills and fever.   HENT:  Positive for congestion, postnasal drip and rhinorrhea. Negative for ear discharge, ear pain and sore throat.    Respiratory:  Positive for cough. Negative for chest tightness and shortness of breath.    Cardiovascular:  Negative for chest pain.   Gastrointestinal:  Negative for diarrhea, nausea and vomiting.   Genitourinary:  Negative for dysuria.   Musculoskeletal:  Positive for arthralgias.   Skin:  Negative for rash.   Allergic/Immunologic: Negative for environmental allergies and food allergies.   Neurological:  Negative for dizziness and headaches.       PAST MEDICAL HISTORY         Diagnosis Date    Pyloric ulcer     Restless legs syndrome (RLS)        SURGICALHISTORY     Patient  has no past surgical history on file.    CURRENT MEDICATIONS       Previous Medications    COD LIVER OIL 1250-135 UNITS CAPS    Take by mouth    MULTIPLE VITAMIN (MULTIVITAMIN ADULT PO)    Take by mouth    OMEPRAZOLE (PRILOSEC) 40 MG DELAYED RELEASE CAPSULE    TAKE 1 CAPSULE BY MOUTH ONCE DAILY IN THE MORNING 30 TO 60 MINUTES BEFORE BREAKFAST       ALLERGIES     Patient is has No Known Allergies.    Patients   There is no immunization history on file for this patient.    FAMILY  nightly as needed (GERD)    FLUTICASONE (FLONASE) 50 MCG/ACT NASAL SPRAY    2 sprays by Each Nostril route daily    MELOXICAM (MOBIC) 15 MG TABLET    Take 1 tablet by mouth daily       Current Discharge Medication List          RISSA Cornelius - CNP    (Please note that portions of this note were completed with a voice recognition program. Efforts were made to edit the dictations but occasionally words are mis-transcribed.)           Dajuan Sanderson, APRN - CNP  02/14/25 [id number]

## 2025-02-17 ENCOUNTER — HOSPITAL ENCOUNTER (EMERGENCY)
Age: 40
Discharge: HOME | End: 2025-02-17
Payer: COMMERCIAL

## 2025-02-17 VITALS
SYSTOLIC BLOOD PRESSURE: 127 MMHG | TEMPERATURE: 98.8 F | HEART RATE: 69 BPM | OXYGEN SATURATION: 100 % | RESPIRATION RATE: 18 BRPM | DIASTOLIC BLOOD PRESSURE: 77 MMHG

## 2025-02-17 DIAGNOSIS — K61.2 ABSCESS OF ANAL OR RECTAL REGION: Primary | ICD-10-CM

## 2025-02-17 DIAGNOSIS — J06.9 ACUTE UPPER RESPIRATORY INFECTION: ICD-10-CM

## 2025-02-17 PROCEDURE — 99213 OFFICE O/P EST LOW 20 MIN: CPT

## 2025-02-17 RX ORDER — PREDNISONE 20 MG/1
20 TABLET ORAL 2 TIMES DAILY
Qty: 10 TABLET | Refills: 0 | Status: SHIPPED | OUTPATIENT
Start: 2025-02-17 | End: 2025-02-22

## 2025-02-17 RX ORDER — CEPHALEXIN 500 MG/1
500 CAPSULE ORAL 4 TIMES DAILY
Qty: 20 CAPSULE | Refills: 0 | Status: SHIPPED | OUTPATIENT
Start: 2025-02-17 | End: 2025-02-22

## 2025-02-17 ASSESSMENT — ENCOUNTER SYMPTOMS: COUGH: 1

## 2025-02-17 NOTE — DISCHARGE INSTRUCTIONS
Medications as prescribed.  Warm soaks.  Increase water intake, frequent hand washing.  Tylenol / Ibuprofen as needed for fever and or pain.  Follow up with PCP in 3-5 days if no improvement or sooner with worsening symptoms.

## 2025-02-17 NOTE — ED NOTES
To room with c/o continued cough and nasal congestion that started 9 days ago. He was seen here 2/14 and dx with URI. Tx with zithromax that he finished yesterday. \"I feel like I'm getting a little bit better but I think I need to take the medication a little bit longer.\"     Yamile Osullivan, RN  02/17/25 1321       Kosch, Yamile, RN  02/17/25 1321

## 2025-02-17 NOTE — ED PROVIDER NOTES
[facility] URGENT CARE  Urgent Care Encounter       CHIEF COMPLAINT       Chief Complaint   Patient presents with    Cough    Nasal Congestion       Nurses Notes reviewed and I agree except as noted in the HPI.  HISTORY OF PRESENT ILLNESS   Vikram Rodriguez is a 39 y.o. male who presents with concerns of nasal congestion and cough that started nine days ago. Patient also reports concern for an abscess on buttocks that he noticed two days ago. Patient denies any drainage from area, denies any fevers.  Patient reports was seen 2/14, was negative for flu and diagnosed with a URI. Patient reports was prescribed azithromycin and Tessalon. Reports symptoms seem to be improving.     HPI    REVIEW OF SYSTEMS     Review of Systems   Constitutional:  Positive for fatigue. Negative for fever.   HENT:  Positive for congestion (\"improving\").    Respiratory:  Positive for cough (\"improving\").    Musculoskeletal:         Abscess   All other systems reviewed and are negative.      PAST MEDICAL HISTORY         Diagnosis Date    Pyloric ulcer     Restless legs syndrome (RLS)        SURGICALHISTORY     Patient  has no past surgical history on file.    CURRENT MEDICATIONS       Discharge Medication List as of 2/17/2025  1:37 PM        CONTINUE these medications which have NOT CHANGED    Details   omeprazole (PRILOSEC) 40 MG delayed release capsule TAKE 1 CAPSULE BY MOUTH ONCE DAILY IN THE MORNING 30 TO 60 MINUTES BEFORE BREAKFASTHistorical Med      azithromycin (ZITHROMAX Z-SARAH) 250 MG tablet Take 2 tablets (500 mg) on Day 1, and then take 1 tablet (250 mg) on days 2 through 5., Disp-1 packet, R-0Normal      benzonatate (TESSALON) 200 MG capsule Take 1 capsule by mouth 3 times daily as needed for Cough, Disp-30 capsule, R-0Normal      Cod Liver Oil 1250-135 units CAPS Take by mouthHistorical Med      Multiple Vitamin (MULTIVITAMIN ADULT PO) Take by mouthHistorical Med             ALLERGIES     Patient is has No Known  MEDICATIONS:  Discharge Medication List as of 2/17/2025  1:37 PM        START taking these medications    Details   cephALEXin (KEFLEX) 500 MG capsule Take 1 capsule by mouth 4 times daily for 5 days, Disp-20 capsule, R-0Normal      predniSONE (DELTASONE) 20 MG tablet Take 1 tablet by mouth 2 times daily for 5 days, Disp-10 tablet, R-0Normal             Discharge Medication List as of 2/17/2025  1:37 PM          Discharge Medication List as of 2/17/2025  1:37 PM          RISSA Lucio CNP    (Please note that portions of this note were completed with a voice recognition program. Efforts were made to edit the dictations but occasionally words are mis-transcribed.)            Jennifer Coats APRN - CNP  02/17/25 [id number]

## 2025-03-04 ENCOUNTER — OFFICE VISIT (OUTPATIENT)
Dept: FAMILY MEDICINE CLINIC | Age: 40
End: 2025-03-04
Payer: COMMERCIAL

## 2025-03-04 VITALS
SYSTOLIC BLOOD PRESSURE: 118 MMHG | HEIGHT: 73 IN | TEMPERATURE: 98.4 F | OXYGEN SATURATION: 98 % | RESPIRATION RATE: 18 BRPM | BODY MASS INDEX: 31.33 KG/M2 | DIASTOLIC BLOOD PRESSURE: 64 MMHG | HEART RATE: 75 BPM | WEIGHT: 236.4 LBS

## 2025-03-04 DIAGNOSIS — Z91.89 AT RISK FOR SEXUALLY TRANSMITTED INFECTION DUE TO UNPROTECTED SEX: ICD-10-CM

## 2025-03-04 DIAGNOSIS — R05.9 COUGH, UNSPECIFIED TYPE: Primary | ICD-10-CM

## 2025-03-04 DIAGNOSIS — K61.0 ANAL ABSCESS: ICD-10-CM

## 2025-03-04 PROCEDURE — 99213 OFFICE O/P EST LOW 20 MIN: CPT

## 2025-03-04 PROCEDURE — 1036F TOBACCO NON-USER: CPT

## 2025-03-04 PROCEDURE — G8427 DOCREV CUR MEDS BY ELIG CLIN: HCPCS

## 2025-03-04 PROCEDURE — G8417 CALC BMI ABV UP PARAM F/U: HCPCS

## 2025-03-04 RX ORDER — CIPROFLOXACIN 500 MG/1
500 TABLET, FILM COATED ORAL 2 TIMES DAILY
Qty: 14 TABLET | Refills: 0 | Status: SHIPPED | OUTPATIENT
Start: 2025-03-04 | End: 2025-03-11

## 2025-03-04 SDOH — ECONOMIC STABILITY: FOOD INSECURITY: WITHIN THE PAST 12 MONTHS, YOU WORRIED THAT YOUR FOOD WOULD RUN OUT BEFORE YOU GOT MONEY TO BUY MORE.: NEVER TRUE

## 2025-03-04 SDOH — ECONOMIC STABILITY: FOOD INSECURITY: WITHIN THE PAST 12 MONTHS, THE FOOD YOU BOUGHT JUST DIDN'T LAST AND YOU DIDN'T HAVE MONEY TO GET MORE.: NEVER TRUE

## 2025-03-04 ASSESSMENT — PATIENT HEALTH QUESTIONNAIRE - PHQ9
1. LITTLE INTEREST OR PLEASURE IN DOING THINGS: NOT AT ALL
2. FEELING DOWN, DEPRESSED OR HOPELESS: NOT AT ALL
SUM OF ALL RESPONSES TO PHQ QUESTIONS 1-9: 0

## 2025-03-04 NOTE — PROGRESS NOTES
S: 39 y.o. male with   Chief Complaint   Patient presents with    Fatigue    Congestion    Cough    Fever     Ongoing symptoms for several weeks. 2 visits to .   -- HA, sinus pressure, cough  -- subjective fevers/chills.   Feels similar to episode last year -- cipro helped.   Covid19 swab yesterday at home, neg.  Good hydration    Anal abscess, maybe better, not draining. Not wanting exam today.    BP Readings from Last 3 Encounters:   03/04/25 118/64   02/17/25 127/77   02/14/25 119/84     Wt Readings from Last 3 Encounters:   03/04/25 107.2 kg (236 lb 6.4 oz)   02/14/25 108.1 kg (238 lb 6.4 oz)   10/29/24 86.2 kg (190 lb)       O: VS:   Vitals:    03/04/25 0806   BP: 118/64   Site: Left Upper Arm   Position: Sitting   Cuff Size: Medium Adult   Pulse: 75   Resp: 18   Temp: 98.4 °F (36.9 °C)   TempSrc: Temporal   SpO2: 98%   Weight: 107.2 kg (236 lb 6.4 oz)   Height: 1.854 m (6' 1\")     Body mass index is 31.19 kg/m².    Lab Results   Component Value Date    WBC 5.9 10/29/2024    HGB 14.3 10/29/2024    HCT 42.6 10/29/2024     10/29/2024    AST 22 03/17/2023     10/29/2024    K 3.8 10/29/2024     10/29/2024    CREATININE 0.9 10/29/2024    BUN 6 (L) 10/29/2024    CO2 26 10/29/2024    TSH 1.960 02/28/2024    LABGLOM > 90 10/29/2024    MG 2.0 07/30/2024    CALCIUM 9.0 10/29/2024    VITD25 62 07/30/2024       No results found.         Diagnosis Orders   1. Cough, unspecified type  AMB POC COVID-19 & INFLUENZA A/B      2. Anal abscess        3. Screening for HIV without presence of risk factors        4. Need for hepatitis C screening test        5. Encounter for screening for diabetes mellitus            Plan    Exam fairly benign per resident  Ongoing nasal and sinus symptoms.  Antihistamine, nasal saline spray may suffice, along with time and other conservative measures.  Anal abscess -- declines exam  Cipro okay  Follow up with work up if not improved    Addendum:  Patient later expressed STD 
further workup at this time.     Objective:     Vitals:    03/04/25 0806   BP: 118/64   Pulse: 75   Resp: 18   Temp: 98.4 °F (36.9 °C)   SpO2: 98%         Wt Readings from Last 3 Encounters:   03/04/25 107.2 kg (236 lb 6.4 oz)   02/14/25 108.1 kg (238 lb 6.4 oz)   10/29/24 86.2 kg (190 lb)       BP Readings from Last 3 Encounters:   03/04/25 118/64   02/17/25 127/77   02/14/25 119/84       Lab Results   Component Value Date    WBC 5.9 10/29/2024    HGB 14.3 10/29/2024    HCT 42.6 10/29/2024    MCV 84.7 10/29/2024     10/29/2024     Lab Results   Component Value Date     10/29/2024    K 3.8 10/29/2024     10/29/2024    CO2 26 10/29/2024    BUN 6 (L) 10/29/2024    CREATININE 0.9 10/29/2024    GLUCOSE 114 (H) 10/29/2024    CALCIUM 9.0 10/29/2024    BILITOT 0.7 03/17/2023    ALKPHOS 69 03/17/2023    AST 22 03/17/2023    ALT 30 03/17/2023    LABGLOM > 90 10/29/2024     Lab Results   Component Value Date    TSH 1.960 02/28/2024     No results found for: \"LABA1C\"  No results found for: \"EAG\"  No results found for: \"CHOL\"  No results found for: \"TRIG\"  No results found for: \"HDL\"  No components found for: \"LDLCHOLESTEROL\", \"LDLCALC\"  No results found for: \"PSA\"  Lab Results   Component Value Date    SGFCUDNW23 578 04/03/2024     Lab Results   Component Value Date/Time    VITD25 62 07/30/2024 01:11 PM          No components found for: \"LABMICR\", \"[id number]\"    Review of Systems   All other systems reviewed and are negative.      Physical Exam  Vitals reviewed.   Constitutional:       Appearance: Normal appearance.   HENT:      Right Ear: Tympanic membrane, ear canal and external ear normal.      Left Ear: Tympanic membrane, ear canal and external ear normal.      Nose: Nose normal.      Mouth/Throat:      Mouth: Mucous membranes are moist.   Cardiovascular:      Rate and Rhythm: Normal rate and regular rhythm.      Heart sounds: Normal heart sounds.   Pulmonary:      Effort: Pulmonary effort is normal.

## 2025-04-01 ENCOUNTER — OFFICE VISIT (OUTPATIENT)
Dept: FAMILY MEDICINE CLINIC | Age: 40
End: 2025-04-01
Payer: COMMERCIAL

## 2025-04-01 VITALS
RESPIRATION RATE: 18 BRPM | HEART RATE: 74 BPM | BODY MASS INDEX: 31.99 KG/M2 | OXYGEN SATURATION: 96 % | WEIGHT: 241.4 LBS | SYSTOLIC BLOOD PRESSURE: 130 MMHG | DIASTOLIC BLOOD PRESSURE: 80 MMHG | HEIGHT: 73 IN | TEMPERATURE: 98.3 F

## 2025-04-01 DIAGNOSIS — E78.2 MIXED HYPERLIPIDEMIA: Primary | ICD-10-CM

## 2025-04-01 DIAGNOSIS — R73.03 PREDIABETES: ICD-10-CM

## 2025-04-01 PROCEDURE — G8417 CALC BMI ABV UP PARAM F/U: HCPCS

## 2025-04-01 PROCEDURE — G8427 DOCREV CUR MEDS BY ELIG CLIN: HCPCS

## 2025-04-01 PROCEDURE — 1036F TOBACCO NON-USER: CPT

## 2025-04-01 PROCEDURE — 99214 OFFICE O/P EST MOD 30 MIN: CPT

## 2025-04-01 RX ORDER — METFORMIN HYDROCHLORIDE 500 MG/1
500 TABLET, EXTENDED RELEASE ORAL
Qty: 90 TABLET | Refills: 1 | Status: SHIPPED | OUTPATIENT
Start: 2025-04-01 | End: 2025-09-28

## 2025-04-01 RX ORDER — ROSUVASTATIN CALCIUM 10 MG/1
10 TABLET, COATED ORAL NIGHTLY
Qty: 90 TABLET | Refills: 1 | Status: SHIPPED | OUTPATIENT
Start: 2025-04-01

## 2025-04-01 RX ORDER — METRONIDAZOLE 500 MG/1
TABLET ORAL
COMMUNITY
Start: 2025-01-07 | End: 2025-04-01 | Stop reason: ALTCHOICE

## 2025-04-01 RX ORDER — TETRACYCLINE HYDROCHLORIDE 500 MG/1
CAPSULE ORAL
COMMUNITY
Start: 2025-01-07 | End: 2025-04-01 | Stop reason: ALTCHOICE

## 2025-04-01 RX ORDER — MELOXICAM 15 MG/1
TABLET ORAL
COMMUNITY
End: 2025-04-01

## 2025-04-01 NOTE — PROGRESS NOTES
Patient:Vikram Rodriguez  YOB: 1985   MRN:[id number]    Subjective   39 y.o. male who presents for the following: Discuss Labs (Patient in office today to discuss test/lab results./)    Recently had lab testing with outside system that yielded concerning results. Specifically, he is concerned with his cholesterol levels and his A1c.      Review of Systems   Constitutional:  Negative for activity change, appetite change, chills, fatigue and fever.   HENT:  Negative for dental problem, hearing loss and sneezing.    Eyes:  Negative for photophobia and visual disturbance.   Respiratory:  Negative for cough and wheezing.    Cardiovascular:  Negative for chest pain and palpitations.   Gastrointestinal:  Negative for abdominal pain, blood in stool and vomiting.   Genitourinary:  Negative for difficulty urinating, dysuria and hematuria.   Musculoskeletal:  Negative for gait problem and neck stiffness.   Skin:  Negative for rash and wound.   Allergic/Immunologic: Negative for immunocompromised state.   Neurological:  Negative for seizures.   Hematological:  Does not bruise/bleed easily.   Psychiatric/Behavioral:  Negative for behavioral problems and dysphoric mood. The patient is not nervous/anxious.      PMHx: He has a past medical history of Pyloric ulcer and Restless legs syndrome (RLS).    Objective     Vitals:    04/01/25 1500   BP: 130/80   BP Site: Left Upper Arm   Patient Position: Sitting   BP Cuff Size: Large Adult   Pulse: 74   Resp: 18   Temp: 98.3 °F (36.8 °C)   TempSrc: Oral   SpO2: 96%   Weight: 109.5 kg (241 lb 6.4 oz)   Height: 1.854 m (6' 1\")   Body mass index is 31.85 kg/m².    Physical Exam  Constitutional:       General: He is not in acute distress.     Appearance: Normal appearance. He is not ill-appearing, toxic-appearing or diaphoretic.   HENT:      Head: Normocephalic and atraumatic.      Right Ear: External ear normal.      Left Ear: External ear normal.      Nose: Nose normal. No 
Maintenance Due   Topic Date Due    Varicella vaccine (1 of 2 - 13+ 2-dose series) Never done    HIV screen  Never done    Hepatitis C screen  Never done    Hepatitis B vaccine (1 of 3 - 19+ 3-dose series) Never done    DTaP/Tdap/Td vaccine (1 - Tdap) Never done    Diabetes screen  Never done    COVID-19 Vaccine (1 - 2024-25 season) Never done         Attending Physician Statement  I have discussed the case, including pertinent history and exam findings with the resident. I also have seen the patient and performed key portions of the examination.  I agree with the documented assessment and plan as documented by the resident.  GC modifier added to this encounter      Yani Fernandez MD 4/1/2025 3:40 PM

## 2025-04-07 PROBLEM — R73.03 PREDIABETES: Status: ACTIVE | Noted: 2025-04-07

## 2025-04-07 PROBLEM — E78.2 MIXED HYPERLIPIDEMIA: Status: ACTIVE | Noted: 2025-04-07

## 2025-04-07 ASSESSMENT — ENCOUNTER SYMPTOMS
BLOOD IN STOOL: 0
ABDOMINAL PAIN: 0
PHOTOPHOBIA: 0
VOMITING: 0
COUGH: 0
WHEEZING: 0

## 2025-04-07 NOTE — ASSESSMENT & PLAN NOTE
Outside lab testing A1c at 6.3. New, not at goal (unstable), changes made today: start metformin 500 mg XR, medication adherence emphasized, and lifestyle modifications recommended. Medication refills and Lab work as below.

## 2025-04-07 NOTE — ASSESSMENT & PLAN NOTE
Outside labs as follows: LDL at 127 (H), HDL at 31 (L), Triglycerides at 229 (High), total cholesterol at 199. New, not at goal (unstable), changes made today: patient would like aggressive risk modification given family history, will add Crestor 10 mg for statin therapy, medication adherence emphasized, and lifestyle modifications recommended. Medication refills and Lab work as below.

## 2025-05-06 ENCOUNTER — HOSPITAL ENCOUNTER (EMERGENCY)
Age: 40
Discharge: HOME | End: 2025-05-06
Payer: COMMERCIAL

## 2025-05-06 VITALS
HEART RATE: 65 BPM | BODY MASS INDEX: 32.82 KG/M2 | WEIGHT: 247.6 LBS | SYSTOLIC BLOOD PRESSURE: 124 MMHG | HEIGHT: 73 IN | OXYGEN SATURATION: 96 % | RESPIRATION RATE: 16 BRPM | DIASTOLIC BLOOD PRESSURE: 74 MMHG | TEMPERATURE: 98.1 F

## 2025-05-06 DIAGNOSIS — J06.9 UPPER RESPIRATORY TRACT INFECTION, UNSPECIFIED TYPE: Primary | ICD-10-CM

## 2025-05-06 PROCEDURE — 99213 OFFICE O/P EST LOW 20 MIN: CPT

## 2025-05-06 PROCEDURE — 99213 OFFICE O/P EST LOW 20 MIN: CPT | Performed by: NURSE PRACTITIONER

## 2025-05-06 RX ORDER — MAGNESIUM GLUCONATE 27 MG(500)
500 TABLET ORAL 2 TIMES DAILY
COMMUNITY

## 2025-05-06 RX ORDER — CALCIUM CARBONATE 500(1250)
500 TABLET ORAL DAILY
COMMUNITY

## 2025-05-06 RX ORDER — CIPROFLOXACIN 500 MG/1
500 TABLET, FILM COATED ORAL 2 TIMES DAILY
Qty: 14 TABLET | Refills: 0 | Status: SHIPPED | OUTPATIENT
Start: 2025-05-06 | End: 2025-05-13

## 2025-05-06 ASSESSMENT — PAIN - FUNCTIONAL ASSESSMENT
PAIN_FUNCTIONAL_ASSESSMENT: 0-10
PAIN_FUNCTIONAL_ASSESSMENT: ACTIVITIES ARE NOT PREVENTED

## 2025-05-06 ASSESSMENT — PAIN DESCRIPTION - DESCRIPTORS: DESCRIPTORS: ACHING

## 2025-05-06 ASSESSMENT — PAIN DESCRIPTION - PAIN TYPE: TYPE: ACUTE PAIN

## 2025-05-06 ASSESSMENT — PAIN DESCRIPTION - LOCATION: LOCATION: GENERALIZED

## 2025-05-06 ASSESSMENT — ENCOUNTER SYMPTOMS: COUGH: 1

## 2025-05-06 ASSESSMENT — PAIN SCALES - GENERAL: PAINLEVEL_OUTOF10: 6

## 2025-05-06 NOTE — ED TRIAGE NOTES
Patient ambulated to room with complaint of body ache, joint pain, watery eyes and shortness of breath with activity. States symptoms started 4 days ago

## 2025-05-06 NOTE — DISCHARGE INSTRUCTIONS
Recommend over-the-counter loratadine with pseudoephedrine.    Follow-up with your primary care provider if symptoms return.

## 2025-05-06 NOTE — ED PROVIDER NOTES
[facility] URGENT CARE  UrgentCare Encounter      CHIEFCOMPLAINT       Chief Complaint   Patient presents with    Generalized Body Aches       Nurses Notes reviewed and I agree except as noted in the HPI.  HISTORY OF PRESENT ILLNESS   Vikram Rodriguez is a 39 y.o. male who presents to urgent care with complaints of generalized bodyaches.  He states he has had joint pain, body aches, watery eyes, cough.  Symptom onset was 4 days ago.  He states that every time that this happens he takes Cipro and he feels better.  He is declining any testing at this time.  He has not taken anything over-the-counter for symptom relief.    REVIEW OF SYSTEMS     Review of Systems   Constitutional:  Positive for fatigue.   Respiratory:  Positive for cough.    Musculoskeletal:  Positive for myalgias.       PAST MEDICAL HISTORY         Diagnosis Date    Pyloric ulcer     Restless legs syndrome (RLS)        SURGICAL HISTORY     Patient  has no past surgical history on file.    CURRENT MEDICATIONS       Discharge Medication List as of 5/6/2025  5:50 PM        CONTINUE these medications which have NOT CHANGED    Details   calcium carbonate (OSCAL) 500 MG TABS tablet Take 1 tablet by mouth dailyHistorical Med      magnesium gluconate (MAGONATE) 500 MG tablet Take 1 tablet by mouth 2 times dailyHistorical Med      rosuvastatin (CRESTOR) 10 MG tablet Take 1 tablet by mouth at bedtime, Disp-90 tablet, R-1Normal      metFORMIN (GLUCOPHAGE-XR) 500 MG extended release tablet Take 1 tablet by mouth daily (with breakfast), Disp-90 tablet, R-1Normal             ALLERGIES     Patient is has no known allergies.    FAMILY HISTORY     Patient'sfamily history includes Hypertension in his father; No Known Problems in his mother; Prostate Cancer in his father.    SOCIAL HISTORY     Patient  reports that he has never smoked. He has never been exposed to tobacco smoke. He has never used smokeless tobacco. He reports that he does not currently use alcohol after a

## 2025-06-17 ENCOUNTER — LAB (OUTPATIENT)
Dept: LAB | Age: 40
End: 2025-06-17

## 2025-06-17 ENCOUNTER — OFFICE VISIT (OUTPATIENT)
Dept: FAMILY MEDICINE CLINIC | Age: 40
End: 2025-06-17
Payer: COMMERCIAL

## 2025-06-17 VITALS
WEIGHT: 239.6 LBS | HEIGHT: 73 IN | HEART RATE: 75 BPM | RESPIRATION RATE: 17 BRPM | TEMPERATURE: 98 F | SYSTOLIC BLOOD PRESSURE: 128 MMHG | BODY MASS INDEX: 31.75 KG/M2 | OXYGEN SATURATION: 95 % | DIASTOLIC BLOOD PRESSURE: 76 MMHG

## 2025-06-17 DIAGNOSIS — J30.2 SEASONAL ALLERGIES: ICD-10-CM

## 2025-06-17 DIAGNOSIS — K21.9 GASTROESOPHAGEAL REFLUX DISEASE, UNSPECIFIED WHETHER ESOPHAGITIS PRESENT: ICD-10-CM

## 2025-06-17 DIAGNOSIS — R55 PRE-SYNCOPE: ICD-10-CM

## 2025-06-17 DIAGNOSIS — R55 PRE-SYNCOPE: Primary | ICD-10-CM

## 2025-06-17 LAB — TSH SERPL DL<=0.05 MIU/L-ACNC: 1.61 UIU/ML (ref 0.27–4.2)

## 2025-06-17 PROCEDURE — 1036F TOBACCO NON-USER: CPT

## 2025-06-17 PROCEDURE — 99214 OFFICE O/P EST MOD 30 MIN: CPT

## 2025-06-17 PROCEDURE — G8427 DOCREV CUR MEDS BY ELIG CLIN: HCPCS

## 2025-06-17 PROCEDURE — G8417 CALC BMI ABV UP PARAM F/U: HCPCS

## 2025-06-17 RX ORDER — FLUTICASONE PROPIONATE 50 MCG
2 SPRAY, SUSPENSION (ML) NASAL DAILY
Qty: 16 G | Refills: 0 | Status: SHIPPED | OUTPATIENT
Start: 2025-06-17

## 2025-06-17 RX ORDER — PANTOPRAZOLE SODIUM 40 MG/1
40 TABLET, DELAYED RELEASE ORAL
Qty: 90 TABLET | Refills: 1 | Status: SHIPPED | OUTPATIENT
Start: 2025-06-17

## 2025-06-17 RX ORDER — LORATADINE 10 MG/1
10 TABLET ORAL DAILY
Qty: 90 TABLET | Refills: 3 | Status: CANCELLED | OUTPATIENT
Start: 2025-06-17

## 2025-06-17 NOTE — PROGRESS NOTES
S: 40 y.o. male with   Chief Complaint   Patient presents with    Fatigue     Patient states he is having fatigue, dizziness, headache, and sxs are worse when he is walking. Patient states that when he is hungry, he gets dizzy and restless. Patient states this has been going on for a while.       Chief complaint, [place], and all pertinent details of the case reviewed with the resident.    Please see resident's note for specific details discussed at today's visit.    BP Readings from Last 3 Encounters:   06/17/25 128/76   05/06/25 124/74   04/01/25 130/80     Wt Readings from Last 3 Encounters:   06/17/25 108.7 kg (239 lb 9.6 oz)   05/06/25 112.3 kg (247 lb 9.6 oz)   04/01/25 109.5 kg (241 lb 6.4 oz)       O: VS:  height is 1.854 m (6' 1\") and weight is 108.7 kg (239 lb 9.6 oz). His oral temperature is 98 °F (36.7 °C). His blood pressure is 128/76 and his pulse is 75. His respiration is 17 and oxygen saturation is 95%.   AAO/NAD, appropriate affect for mood  CV:  RRR, 1/6 sys  murmur  Resp: CTAB  Ext: no edema  ECHO in 2023 showed mild mitral regurg and 50-55% EF       Diagnosis Orders   1. Pre-syncope  Extended cardiac holter monitor (3 days-14 day)    TSH reflex to FT4    Echo (TTE) complete (PRN contrast/bubble/strain/3D)      2. Gastroesophageal reflux disease, unspecified whether esophagitis present  pantoprazole (PROTONIX) 40 MG tablet      3. Seasonal allergies  fluticasone (FLONASE) 50 MCG/ACT nasal spray          Plan:  Start with labs already ordered, add TSH  If labs don't explain symptoms, would get ECHO next  Can pursue holter after that  Flonase and claritin for serous otitis and eye redness  F/u in 2 weeks- discuss results and plan for future- discuss metformin and crestor- note that pt not taking    Health Maintenance Due   Topic Date Due    A1C test (Diabetic or Prediabetic)  Never done    Lipids  Never done    Varicella vaccine (1 of 2 - 13+ 2-dose series) Never done    HIV screen  Never done

## 2025-06-17 NOTE — PROGRESS NOTES
Patient:Vikram Rodriguez  YOB: 1985   MRN:[id number]    Subjective   40 y.o. male who presents for the following: Fatigue (Patient states he is having fatigue, dizziness, headache, and sxs are worse when he is walking. Patient states that when he is hungry, he gets dizzy and restless. Patient states this has been going on for a while.)    Patient is having increased fatigue and dizziness when walking. 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 Seems to be feeling dizzy when walking around. This happens all the time when walking. This has been going on for about a year but has gotten worse. He notes that his eyes are red. He also notes his symptoms happen when he is hungry. He notes during the episodes his vision is blurry and feels like he is going to pass out. He then feels like his heart \"tightens\" and \" him\". He is drinking a lot of water and is eating healthy.     Fatigue  Associated symptoms include fatigue.     Review of Systems   Constitutional:  Positive for fatigue.   Neurological:  Positive for dizziness and light-headedness.     PMHx: He has a past medical history of Pyloric ulcer and Restless legs syndrome (RLS).    Objective     Vitals:    06/17/25 1042   BP: 128/76   BP Site: Left Upper Arm   Patient Position: Sitting   BP Cuff Size: Large Adult   Pulse: 75   Resp: 17   Temp: 98 °F (36.7 °C)   TempSrc: Oral   SpO2: 95%   Weight: 108.7 kg (239 lb 9.6 oz)   Height: 1.854 m (6' 1\")   Body mass index is 31.61 kg/m².    Physical Exam  Constitutional:       General: He is not in acute distress.     Appearance: Normal appearance. He is not ill-appearing, toxic-appearing or diaphoretic.   HENT:      Head: Normocephalic and atraumatic.      Right Ear: External ear normal. A middle ear effusion is present.      Left Ear: External ear normal. A middle ear effusion is present.      Nose: Nose normal. No congestion or rhinorrhea.      Mouth/Throat:      Mouth: Mucous membranes are moist.

## 2025-06-19 ENCOUNTER — RESULTS FOLLOW-UP (OUTPATIENT)
Dept: FAMILY MEDICINE CLINIC | Age: 40
End: 2025-06-19

## 2025-06-26 ENCOUNTER — RESULTS FOLLOW-UP (OUTPATIENT)
Dept: FAMILY MEDICINE CLINIC | Age: 40
End: 2025-06-26

## 2025-06-26 ENCOUNTER — LAB (OUTPATIENT)
Dept: LAB | Age: 40
End: 2025-06-26

## 2025-06-26 DIAGNOSIS — R73.03 PREDIABETES: ICD-10-CM

## 2025-06-26 DIAGNOSIS — E78.2 MIXED HYPERLIPIDEMIA: ICD-10-CM

## 2025-06-26 LAB
ALBUMIN SERPL BCG-MCNC: 4.3 G/DL (ref 3.4–4.9)
ALP SERPL-CCNC: 58 U/L (ref 40–129)
ALT SERPL W/O P-5'-P-CCNC: 42 U/L (ref 10–50)
ANION GAP SERPL CALC-SCNC: 13 MEQ/L (ref 8–16)
AST SERPL-CCNC: 34 U/L (ref 10–50)
BASOPHILS ABSOLUTE: 0 THOU/MM3 (ref 0–0.1)
BASOPHILS NFR BLD AUTO: 0.4 %
BILIRUB SERPL-MCNC: 0.8 MG/DL (ref 0.3–1.2)
BUN SERPL-MCNC: 11 MG/DL (ref 8–23)
CALCIUM SERPL-MCNC: 9.4 MG/DL (ref 8.6–10)
CHLORIDE SERPL-SCNC: 105 MEQ/L (ref 98–111)
CHOLESTEROL, FASTING: 163 MG/DL (ref 100–199)
CO2 SERPL-SCNC: 23 MEQ/L (ref 22–29)
CREAT SERPL-MCNC: 1 MG/DL (ref 0.7–1.2)
DEPRECATED MEAN GLUCOSE BLD GHB EST-ACNC: 126 MG/DL (ref 70–126)
DEPRECATED RDW RBC AUTO: 46.2 FL (ref 35–45)
EOSINOPHIL NFR BLD AUTO: 0.8 %
EOSINOPHILS ABSOLUTE: 0 THOU/MM3 (ref 0–0.4)
ERYTHROCYTE [DISTWIDTH] IN BLOOD BY AUTOMATED COUNT: 14.8 % (ref 11.5–14.5)
GFR SERPL CREATININE-BSD FRML MDRD: > 90 ML/MIN/1.73M2
GLUCOSE FASTING: 99 MG/DL (ref 74–109)
HBA1C MFR BLD HPLC: 6.2 % (ref 4–6)
HCT VFR BLD AUTO: 45.5 % (ref 42–52)
HDLC SERPL-MCNC: 30 MG/DL
HGB BLD-MCNC: 15.3 GM/DL (ref 14–18)
IMM GRANULOCYTES # BLD AUTO: 0 THOU/MM3 (ref 0–0.07)
IMM GRANULOCYTES NFR BLD AUTO: 0 %
LDLC SERPL CALC-MCNC: 112 MG/DL
LYMPHOCYTES ABSOLUTE: 3.1 THOU/MM3 (ref 1–4.8)
LYMPHOCYTES NFR BLD AUTO: 60.7 %
MCH RBC QN AUTO: 28.7 PG (ref 26–33)
MCHC RBC AUTO-ENTMCNC: 33.6 GM/DL (ref 32.2–35.5)
MCV RBC AUTO: 85.2 FL (ref 80–94)
MONOCYTES ABSOLUTE: 0.4 THOU/MM3 (ref 0.4–1.3)
MONOCYTES NFR BLD AUTO: 7.5 %
NEUTROPHILS ABSOLUTE: 1.6 THOU/MM3 (ref 1.8–7.7)
NEUTROPHILS NFR BLD AUTO: 30.6 %
NRBC BLD AUTO-RTO: 0 /100 WBC
PLATELET # BLD AUTO: 165 THOU/MM3 (ref 130–400)
PMV BLD AUTO: 11.9 FL (ref 9.4–12.4)
POTASSIUM SERPL-SCNC: 3.9 MEQ/L (ref 3.5–5.2)
PROT SERPL-MCNC: 7.4 G/DL (ref 6.4–8.3)
RBC # BLD AUTO: 5.34 MILL/MM3 (ref 4.7–6.1)
SODIUM SERPL-SCNC: 141 MEQ/L (ref 135–145)
TRIGLYCERIDE, FASTING: 103 MG/DL (ref 0–199)
WBC # BLD AUTO: 5.1 THOU/MM3 (ref 4.8–10.8)

## 2025-07-03 ENCOUNTER — RESULTS FOLLOW-UP (OUTPATIENT)
Dept: FAMILY MEDICINE CLINIC | Age: 40
End: 2025-07-03

## 2025-07-03 ENCOUNTER — OFFICE VISIT (OUTPATIENT)
Dept: FAMILY MEDICINE CLINIC | Age: 40
End: 2025-07-03
Payer: COMMERCIAL

## 2025-07-03 ENCOUNTER — LAB (OUTPATIENT)
Dept: LAB | Age: 40
End: 2025-07-03

## 2025-07-03 VITALS
DIASTOLIC BLOOD PRESSURE: 76 MMHG | RESPIRATION RATE: 16 BRPM | SYSTOLIC BLOOD PRESSURE: 128 MMHG | HEART RATE: 68 BPM | HEIGHT: 73 IN | TEMPERATURE: 97.6 F | WEIGHT: 243.4 LBS | BODY MASS INDEX: 32.26 KG/M2

## 2025-07-03 DIAGNOSIS — R25.3 MUSCLE TWITCHING: ICD-10-CM

## 2025-07-03 DIAGNOSIS — R53.82 CHRONIC FATIGUE: ICD-10-CM

## 2025-07-03 DIAGNOSIS — R73.03 PREDIABETES: ICD-10-CM

## 2025-07-03 DIAGNOSIS — G47.00 INSOMNIA, UNSPECIFIED TYPE: ICD-10-CM

## 2025-07-03 DIAGNOSIS — R06.83 SNORING: ICD-10-CM

## 2025-07-03 DIAGNOSIS — R53.82 CHRONIC FATIGUE: Primary | ICD-10-CM

## 2025-07-03 DIAGNOSIS — E78.2 MIXED HYPERLIPIDEMIA: ICD-10-CM

## 2025-07-03 LAB
FOLATE SERPL-MCNC: 15.5 NG/ML (ref 4.6–34.8)
MAGNESIUM SERPL-MCNC: 2 MG/DL (ref 1.6–2.6)
VIT B12 SERPL-MCNC: 450 PG/ML (ref 232–1245)

## 2025-07-03 PROCEDURE — 1036F TOBACCO NON-USER: CPT

## 2025-07-03 PROCEDURE — G8417 CALC BMI ABV UP PARAM F/U: HCPCS

## 2025-07-03 PROCEDURE — 99213 OFFICE O/P EST LOW 20 MIN: CPT

## 2025-07-03 PROCEDURE — G8427 DOCREV CUR MEDS BY ELIG CLIN: HCPCS

## 2025-07-03 NOTE — PROGRESS NOTES
[facility]  [address]  [place] [id number]  Dept: [phone]  Loc: [phone]        Vikram Rodriguez is a 40 y.o. male who presents today for:  Chief Complaint   Patient presents with    Follow-up     Patient states that he is having issues with not being able to sleep at night and experiencing weakness for the past week or two. Patient also states that he has a twitching movement in his body.       HPI:   Vikram Rodriguez is 40 y.o. presents today for follow up for fatigue.     Last seen 6/17  Since then he states that he has been having muscle twitching sometimes at night and sometimes randomly throughout the day. He denies any numbness/tingling. Pt also states that he has only been sleeping about 4 hrs nightly for the past month or so and it has been interrupted. He states that he wakes up randomly throughout the night and feel his heart racing sometimes and other times he is waking up throughout the night to use the bathroom. He states that he had noticed that if he does not drink water prior to going to bed, that helps him not have the increase in heart rate that he wakes up to. He also states that he feels less fatigued when he drinks water when he wakes up in the morning. He currently does not have a bed partner but does state that he has been told he snores. Denies morning headache. Also states that his fatigue is worse when he is sitting under the fan/ air conditioner at work.     He also states that he has changed how he is taking his metformin to every other day instead of daily because it was affecting his mood. He states that since he was told he had prediabetes, he has been exercising more and eating healthier. He states that his diet is generally rich in fruits and vegetables but he will try to increase. Also admits to taking a probiotic daily.     He states that this fatigue has been chronic for him and it only improves after 
Never done       Attending Physician Statement  I have discussed the case, including pertinent history and exam findings with the resident.  I agree with the documented assessment and plan as documented by the resident.        Eliana Alejandre, DO 7/8/2025 3:35 PM

## 2025-07-11 ENCOUNTER — HOSPITAL ENCOUNTER (EMERGENCY)
Age: 40
Discharge: HOME | End: 2025-07-11
Payer: COMMERCIAL

## 2025-07-11 VITALS
BODY MASS INDEX: 30.87 KG/M2 | TEMPERATURE: 97.8 F | DIASTOLIC BLOOD PRESSURE: 79 MMHG | HEART RATE: 94 BPM | RESPIRATION RATE: 18 BRPM | SYSTOLIC BLOOD PRESSURE: 111 MMHG | OXYGEN SATURATION: 99 % | WEIGHT: 234 LBS

## 2025-07-11 DIAGNOSIS — J06.9 ACUTE UPPER RESPIRATORY INFECTION: Primary | ICD-10-CM

## 2025-07-11 PROCEDURE — 99213 OFFICE O/P EST LOW 20 MIN: CPT

## 2025-07-11 PROCEDURE — 99213 OFFICE O/P EST LOW 20 MIN: CPT | Performed by: NURSE PRACTITIONER

## 2025-07-11 RX ORDER — CIPROFLOXACIN 500 MG/1
500 TABLET, FILM COATED ORAL 2 TIMES DAILY
Qty: 14 TABLET | Refills: 0 | Status: SHIPPED | OUTPATIENT
Start: 2025-07-11 | End: 2025-07-18

## 2025-07-11 ASSESSMENT — PAIN - FUNCTIONAL ASSESSMENT: PAIN_FUNCTIONAL_ASSESSMENT: NONE - DENIES PAIN

## 2025-07-11 NOTE — ED PROVIDER NOTES
[facility] URGENT CARE  UrgentCare Encounter      CHIEFCOMPLAINT       Chief Complaint   Patient presents with    Restless    Dizziness    Fatigue       Nurses Notes reviewed and I agree except as noted in the HPI.  HISTORY OF PRESENT ILLNESS     Vikram Rodriguez is a 40 y.o. male who presents to the urgent care for evaluation of symptoms that started 3 weeks ago.  He feels like he has a bacterial infection and is requesting Cipro today as Cipro has helped him in the past with these feelings, and this is what he used to get while he lived in [place] for same symptoms.  He states that he feels fatigued and has trouble with congestion, and his eyes feel watery.    The patient/patient representative has no other acute complaints at this time.    REVIEW OF SYSTEMS     Review of Systems   Constitutional:  Positive for fatigue.   HENT:  Positive for congestion.    Eyes:  Positive for discharge.   All other systems reviewed and are negative.      PAST MEDICAL HISTORY         Diagnosis Date    Pyloric ulcer     Restless legs syndrome (RLS)        SURGICAL HISTORY     Patient  has no past surgical history on file.    CURRENT MEDICATIONS       Discharge Medication List as of 7/11/2025  2:35 PM        CONTINUE these medications which have NOT CHANGED    Details   pantoprazole (PROTONIX) 40 MG tablet Take 1 tablet by mouth every morning (before breakfast), Disp-90 tablet, R-1Normal      fluticasone (FLONASE) 50 MCG/ACT nasal spray 2 sprays by Each Nostril route daily, Disp-16 g, R-0Normal      metFORMIN (GLUCOPHAGE-XR) 500 MG extended release tablet Take 1 tablet by mouth daily (with breakfast), Disp-90 tablet, R-1Normal             ALLERGIES     Patient is has no known allergies.    FAMILY HISTORY     Patient'sfamily history includes Hypertension in his father; No Known Problems in his mother; Prostate Cancer in his father.    SOCIAL HISTORY     Patient  reports that he has never smoked. He has never been exposed to tobacco smoke.

## 2025-07-11 NOTE — ED TRIAGE NOTES
Pt ambulatory to [facility] with c/o feeling restless, fatigue and dizziness x3 weeks. Pt reports not being able to sleep at night. Pt does not have PCP. Pt needing work note for this evening.

## 2025-07-12 ENCOUNTER — HOSPITAL ENCOUNTER (EMERGENCY)
Age: 40
Discharge: HOME | End: 2025-07-12
Payer: COMMERCIAL

## 2025-07-12 ENCOUNTER — APPOINTMENT (OUTPATIENT)
Dept: CT IMAGING | Age: 40
End: 2025-07-12
Payer: COMMERCIAL

## 2025-07-12 VITALS
HEIGHT: 73 IN | SYSTOLIC BLOOD PRESSURE: 141 MMHG | HEART RATE: 66 BPM | BODY MASS INDEX: 31.14 KG/M2 | DIASTOLIC BLOOD PRESSURE: 71 MMHG | TEMPERATURE: 98.5 F | OXYGEN SATURATION: 95 % | WEIGHT: 235 LBS | RESPIRATION RATE: 16 BRPM

## 2025-07-12 DIAGNOSIS — R53.83 OTHER FATIGUE: Primary | ICD-10-CM

## 2025-07-12 LAB
ALBUMIN SERPL BCG-MCNC: 4 G/DL (ref 3.4–4.9)
ALP SERPL-CCNC: 56 U/L (ref 40–129)
ALT SERPL W/O P-5'-P-CCNC: 60 U/L (ref 10–50)
ANION GAP SERPL CALC-SCNC: 11 MEQ/L (ref 8–16)
AST SERPL-CCNC: 54 U/L (ref 10–50)
BASOPHILS ABSOLUTE: 0 THOU/MM3 (ref 0–0.1)
BASOPHILS NFR BLD AUTO: 0.3 %
BILIRUB SERPL-MCNC: 0.7 MG/DL (ref 0.3–1.2)
BUN SERPL-MCNC: 11 MG/DL (ref 8–23)
CALCIUM SERPL-MCNC: 9.5 MG/DL (ref 8.6–10)
CHLORIDE SERPL-SCNC: 102 MEQ/L (ref 98–111)
CO2 SERPL-SCNC: 24 MEQ/L (ref 22–29)
CREAT SERPL-MCNC: 1.1 MG/DL (ref 0.7–1.2)
D DIMER PPP IA.FEU-MCNC: 592 NG/ML FEU (ref 0–500)
DEPRECATED RDW RBC AUTO: 45.4 FL (ref 35–45)
EOSINOPHIL NFR BLD AUTO: 0.9 %
EOSINOPHILS ABSOLUTE: 0.1 THOU/MM3 (ref 0–0.4)
ERYTHROCYTE [DISTWIDTH] IN BLOOD BY AUTOMATED COUNT: 14.8 % (ref 11.5–14.5)
GFR SERPL CREATININE-BSD FRML MDRD: 87 ML/MIN/1.73M2
GLUCOSE SERPL-MCNC: 147 MG/DL (ref 74–109)
HCT VFR BLD AUTO: 46.9 % (ref 42–52)
HGB BLD-MCNC: 16.2 GM/DL (ref 14–18)
IMM GRANULOCYTES # BLD AUTO: 0.01 THOU/MM3 (ref 0–0.07)
IMM GRANULOCYTES NFR BLD AUTO: 0.2 %
LYMPHOCYTES ABSOLUTE: 3.6 THOU/MM3 (ref 1–4.8)
LYMPHOCYTES NFR BLD AUTO: 56.1 %
MCH RBC QN AUTO: 29 PG (ref 26–33)
MCHC RBC AUTO-ENTMCNC: 34.5 GM/DL (ref 32.2–35.5)
MCV RBC AUTO: 84.1 FL (ref 80–94)
MONOCYTES ABSOLUTE: 0.4 THOU/MM3 (ref 0.4–1.3)
MONOCYTES NFR BLD AUTO: 5.7 %
NEUTROPHILS ABSOLUTE: 2.4 THOU/MM3 (ref 1.8–7.7)
NEUTROPHILS NFR BLD AUTO: 36.8 %
NRBC BLD AUTO-RTO: 0 /100 WBC
OSMOLALITY SERPL CALC.SUM OF ELEC: 275.9 MOSMOL/KG (ref 275–300)
PLATELET # BLD AUTO: 179 THOU/MM3 (ref 130–400)
PMV BLD AUTO: 11.4 FL (ref 9.4–12.4)
POTASSIUM SERPL-SCNC: 3.9 MEQ/L (ref 3.5–5.2)
PROT SERPL-MCNC: 6.4 G/DL (ref 6.4–8.3)
RBC # BLD AUTO: 5.58 MILL/MM3 (ref 4.7–6.1)
SODIUM SERPL-SCNC: 137 MEQ/L (ref 135–145)
WBC # BLD AUTO: 6.5 THOU/MM3 (ref 4.8–10.8)

## 2025-07-12 PROCEDURE — 85025 COMPLETE CBC W/AUTO DIFF WBC: CPT

## 2025-07-12 PROCEDURE — 36415 COLL VENOUS BLD VENIPUNCTURE: CPT

## 2025-07-12 PROCEDURE — 71275 CT ANGIOGRAPHY CHEST: CPT

## 2025-07-12 PROCEDURE — 99285 EMERGENCY DEPT VISIT HI MDM: CPT

## 2025-07-12 PROCEDURE — 85379 FIBRIN DEGRADATION QUANT: CPT

## 2025-07-12 PROCEDURE — 6360000004 HC RX CONTRAST MEDICATION

## 2025-07-12 PROCEDURE — 80053 COMPREHEN METABOLIC PANEL: CPT

## 2025-07-12 PROCEDURE — 6370000000 HC RX 637 (ALT 250 FOR IP)

## 2025-07-12 RX ORDER — IOPAMIDOL 755 MG/ML
80 INJECTION, SOLUTION INTRAVASCULAR
Status: COMPLETED | OUTPATIENT
Start: 2025-07-12 | End: 2025-07-12

## 2025-07-12 RX ORDER — HYDROXYZINE HYDROCHLORIDE 10 MG/1
10 TABLET, FILM COATED ORAL ONCE
Status: COMPLETED | OUTPATIENT
Start: 2025-07-12 | End: 2025-07-12

## 2025-07-12 RX ORDER — HYDROXYZINE HYDROCHLORIDE 25 MG/1
25 TABLET, FILM COATED ORAL EVERY 8 HOURS PRN
Qty: 30 TABLET | Refills: 0 | Status: SHIPPED | OUTPATIENT
Start: 2025-07-12 | End: 2025-07-22

## 2025-07-12 RX ADMIN — HYDROXYZINE HYDROCHLORIDE 10 MG: 10 TABLET ORAL at 01:18

## 2025-07-12 RX ADMIN — IOPAMIDOL 80 ML: 755 INJECTION, SOLUTION INTRAVENOUS at 02:49

## 2025-07-12 ASSESSMENT — PAIN - FUNCTIONAL ASSESSMENT: PAIN_FUNCTIONAL_ASSESSMENT: NONE - DENIES PAIN

## 2025-07-12 ASSESSMENT — ENCOUNTER SYMPTOMS: EYE DISCHARGE: 1

## 2025-07-12 NOTE — ED NOTES
Pt ambulated to restroom without difficulty at this time. Respirations even and unlabored. Call light within reach. Will monitor.

## 2025-07-12 NOTE — ED NOTES
Pt updated on awaiting test results. Respirations even and unlabored. Call light within reach. Will monitor.

## 2025-07-12 NOTE — DISCHARGE INSTRUCTIONS
Call [facility] and let them know that you do not have a PCP.  They should be able to take you in.    In the meantime take the Atarax as needed.  Do not take Atarax at the same time as Benadryl as they are the same class of medication.  Be on the look out for any shortness of breath, chest pain, nausea vomiting, fever or chills.  If any of these present then return to the ED immediately.

## 2025-07-12 NOTE — ED NOTES
INT established at this time. Pt educated on time for result and importance of getting test. Respirations even and unlabored. Call light within reach. Will monitor.

## 2025-07-12 NOTE — ED PROVIDER NOTES
[facility] EMERGENCY DEPARTMENT      EMERGENCY MEDICINE     Pt Name: Vikram Rodriguez  MRN: [id number]  Birthdate 1985  Date of evaluation: 2025  Provider: Valeria Partida PA-C    CHIEF COMPLAINT       Chief Complaint   Patient presents with    Fatigue     HISTORY OF PRESENT ILLNESS   Vikram Rodriguez is a pleasant 40 y.o. male who presents to the emergency department from from home, as a walk in to the ED lobby for evaluation of anxiety, fatigue.    Patient states that he gave plasma today and ever since then he has been feeling fatigued.  He woke up today and anxiety and came to the ED.  He denies any chest pain, difficulty breathing, nausea vomiting, fever or chills.  States this is the second time he donated plasma and he did have some anxiety with the first donation as well.    PASTMEDICAL HISTORY     Past Medical History:   Diagnosis Date    Pyloric ulcer     Restless legs syndrome (RLS)        Patient Active Problem List   Diagnosis Code    Cervical spinal stenosis M48.02    Cervical strain S16.1XXA    Gastroesophageal reflux disease K21.9    Hx of peptic ulcer Z87.11    Tinnitus of left ear H93.12    Mixed hyperlipidemia E78.2    Prediabetes R73.03     SURGICAL HISTORY     History reviewed. No pertinent surgical history.    CURRENT MEDICATIONS       Previous Medications    CIPROFLOXACIN (CIPRO) 500 MG TABLET    Take 1 tablet by mouth 2 times daily for 7 days    FLUTICASONE (FLONASE) 50 MCG/ACT NASAL SPRAY    2 sprays by Each Nostril route daily    METFORMIN (GLUCOPHAGE-XR) 500 MG EXTENDED RELEASE TABLET    Take 1 tablet by mouth daily (with breakfast)    PANTOPRAZOLE (PROTONIX) 40 MG TABLET    Take 1 tablet by mouth every morning (before breakfast)       ALLERGIES     has no known allergies.    FAMILY HISTORY     He indicated that his mother is alive. He indicated that his father is .       SOCIAL HISTORY       Social History     Tobacco Use    Smoking status: Never     Passive exposure: Never     performed and results reviewed with the patient. The results of pertinent diagnostic studies and exam findings were discussed. The patient’s provisional diagnosis and plan of care were discussed with the patient and present family who expressed understanding. Any medications were reviewed and indications and risks of medications were discussed with the patient /family present. See ed course. Strict verbal and written return precautions, instructions and appropriate follow-up provided to  the patient.     ED Medications administered this visit:  (None if blank)  Medications   hydrOXYzine HCl (ATARAX) tablet 10 mg (10 mg Oral Given 7/12/25 0118)   iopamidol (ISOVUE-370) 76 % injection 80 mL (80 mLs IntraVENous Given 7/12/25 0249)         PROCEDURES: (None if blank)  Procedures:     CRITICAL CARE: (None if blank)      DISCHARGE PRESCRIPTIONS: (None if blank)  New Prescriptions    HYDROXYZINE HCL (ATARAX) 25 MG TABLET    Take 1 tablet by mouth every 8 hours as needed for Itching       FINAL IMPRESSION      1. Other fatigue          DISPOSITION/PLAN   DISPOSITION Decision To Discharge 07/12/2025 04:00:53 AM   DISPOSITION CONDITION Stable           OUTPATIENT FOLLOW UP THE PATIENT:  [facility]  [address] [address] [phone]  Call   For follow up      ANTOINETTE José Lanz, PA-C  07/12/25 [id number]

## 2025-07-12 NOTE — ED NOTES
Pt reports to the ED from home due to fatigue since donating plasma earlier today. Pt reports it being the second time donating plasma and has fatigue since. Pt reports having \"jitters.\" Pt reports sleeping and waking up from sleep due to anxiety and in a panic. Pt states is able to deep breath and can go back to sleep.

## 2025-08-27 ENCOUNTER — HOSPITAL ENCOUNTER (EMERGENCY)
Age: 40
Discharge: HOME | End: 2025-08-27
Payer: COMMERCIAL

## 2025-08-27 VITALS
SYSTOLIC BLOOD PRESSURE: 117 MMHG | BODY MASS INDEX: 33.54 KG/M2 | OXYGEN SATURATION: 96 % | TEMPERATURE: 98.5 F | DIASTOLIC BLOOD PRESSURE: 72 MMHG | RESPIRATION RATE: 16 BRPM | WEIGHT: 254.2 LBS | HEART RATE: 83 BPM

## 2025-08-27 DIAGNOSIS — Z11.3 SCREEN FOR STD (SEXUALLY TRANSMITTED DISEASE): ICD-10-CM

## 2025-08-27 DIAGNOSIS — R30.0 DYSURIA: Primary | ICD-10-CM

## 2025-08-27 DIAGNOSIS — B35.9 TINEA: ICD-10-CM

## 2025-08-27 LAB
BILIRUB UR STRIP.AUTO-MCNC: NEGATIVE MG/DL
CHARACTER UR: CLEAR
COLOR, UA: YELLOW
GLUCOSE UR QL STRIP.AUTO: NEGATIVE MG/DL
KETONES UR QL STRIP.AUTO: NEGATIVE
NITRITE UR QL STRIP.AUTO: NEGATIVE
PH UR STRIP.AUTO: 7 [PH] (ref 5–9)
PROT UR STRIP.AUTO-MCNC: NEGATIVE MG/DL
RBC #/AREA URNS HPF: NEGATIVE /[HPF]
SP GR UR STRIP.AUTO: 1.01 (ref 1–1.03)
UROBILINOGEN, URINE: 0.2 EU/DL (ref 0.2–1)
WBC #/AREA URNS HPF: NEGATIVE /[HPF]

## 2025-08-27 PROCEDURE — 99213 OFFICE O/P EST LOW 20 MIN: CPT | Performed by: NURSE PRACTITIONER

## 2025-08-27 PROCEDURE — 96372 THER/PROPH/DIAG INJ SC/IM: CPT

## 2025-08-27 PROCEDURE — 81003 URINALYSIS AUTO W/O SCOPE: CPT

## 2025-08-27 PROCEDURE — 87491 CHLMYD TRACH DNA AMP PROBE: CPT

## 2025-08-27 PROCEDURE — 87591 N.GONORRHOEAE DNA AMP PROB: CPT

## 2025-08-27 PROCEDURE — 99213 OFFICE O/P EST LOW 20 MIN: CPT

## 2025-08-27 PROCEDURE — 6360000002 HC RX W HCPCS: Performed by: NURSE PRACTITIONER

## 2025-08-27 RX ORDER — NICOTINE 14MG/24HR
1 PATCH, TRANSDERMAL 24 HOURS TRANSDERMAL DAILY
Qty: 60 CAPSULE | Refills: 0 | Status: SHIPPED | OUTPATIENT
Start: 2025-08-27

## 2025-08-27 RX ORDER — DOXYCYCLINE HYCLATE 100 MG
100 TABLET ORAL 2 TIMES DAILY
Qty: 14 TABLET | Refills: 0 | Status: SHIPPED | OUTPATIENT
Start: 2025-08-27 | End: 2025-09-03

## 2025-08-27 RX ORDER — KETOCONAZOLE 20 MG/G
CREAM TOPICAL
Qty: 15 G | Refills: 0 | Status: SHIPPED | OUTPATIENT
Start: 2025-08-27

## 2025-08-27 RX ADMIN — LIDOCAINE HYDROCHLORIDE 500 MG: 10 INJECTION, SOLUTION EPIDURAL; INFILTRATION; INTRACAUDAL; PERINEURAL at 18:40

## 2025-08-27 ASSESSMENT — ENCOUNTER SYMPTOMS
CHOKING: 0
NAUSEA: 0
COUGH: 0
CHEST TIGHTNESS: 0
STRIDOR: 0
SHORTNESS OF BREATH: 0
VOMITING: 0
WHEEZING: 0
APNEA: 0

## 2025-08-27 ASSESSMENT — PAIN - FUNCTIONAL ASSESSMENT
PAIN_FUNCTIONAL_ASSESSMENT: ACTIVITIES ARE NOT PREVENTED
PAIN_FUNCTIONAL_ASSESSMENT: 0-10

## 2025-08-27 ASSESSMENT — PAIN SCALES - GENERAL: PAINLEVEL_OUTOF10: 0

## 2025-08-29 LAB
CHLAMYDIA DNA UR QL NAA+PROBE: NEGATIVE
CHLAMYDIA, GC DNA AMP, URINE: NORMAL
N GONORRHOEA DNA UR QL NAA+PROBE: NEGATIVE